# Patient Record
Sex: FEMALE | Race: WHITE
[De-identification: names, ages, dates, MRNs, and addresses within clinical notes are randomized per-mention and may not be internally consistent; named-entity substitution may affect disease eponyms.]

---

## 2021-10-03 ENCOUNTER — HOSPITAL ENCOUNTER (EMERGENCY)
Dept: HOSPITAL 79 - ER1 | Age: 56
Discharge: HOME | End: 2021-10-03
Payer: MEDICARE

## 2021-10-03 DIAGNOSIS — R07.2: Primary | ICD-10-CM

## 2021-10-03 LAB
BUN/CREATININE RATIO: 17 (ref 0–10)
HGB BLD-MCNC: 14.4 GM/DL (ref 12.3–15.3)
RED BLOOD COUNT: 4.68 M/UL (ref 4–5.1)
WHITE BLOOD COUNT: 11.1 K/UL (ref 4.5–11)

## 2021-11-08 ENCOUNTER — OFFICE VISIT (OUTPATIENT)
Dept: CARDIOLOGY | Facility: CLINIC | Age: 56
End: 2021-11-08

## 2021-11-08 VITALS
DIASTOLIC BLOOD PRESSURE: 74 MMHG | SYSTOLIC BLOOD PRESSURE: 139 MMHG | BODY MASS INDEX: 22.96 KG/M2 | HEART RATE: 75 BPM | OXYGEN SATURATION: 98 % | HEIGHT: 71 IN | WEIGHT: 164 LBS

## 2021-11-08 DIAGNOSIS — R07.2 PRECORDIAL CHEST PAIN: Primary | ICD-10-CM

## 2021-11-08 DIAGNOSIS — R06.02 SOB (SHORTNESS OF BREATH): ICD-10-CM

## 2021-11-08 PROCEDURE — 99204 OFFICE O/P NEW MOD 45 MIN: CPT | Performed by: NURSE PRACTITIONER

## 2021-11-08 RX ORDER — CITALOPRAM 20 MG/1
20 TABLET ORAL DAILY
COMMUNITY

## 2021-11-08 RX ORDER — SIMVASTATIN 20 MG
20 TABLET ORAL NIGHTLY
COMMUNITY
End: 2022-02-10 | Stop reason: SDUPTHER

## 2021-11-08 RX ORDER — ARFORMOTEROL TARTRATE 15 UG/2ML
SOLUTION RESPIRATORY (INHALATION)
COMMUNITY

## 2021-11-08 RX ORDER — OMEPRAZOLE 20 MG/1
20 CAPSULE, DELAYED RELEASE ORAL DAILY
COMMUNITY
End: 2022-03-11

## 2021-11-08 RX ORDER — AMOXICILLIN 500 MG/1
1000 CAPSULE ORAL 2 TIMES DAILY
COMMUNITY
End: 2022-01-19

## 2021-11-08 RX ORDER — BUDESONIDE 0.25 MG/2ML
0.25 INHALANT ORAL
COMMUNITY

## 2021-11-08 RX ORDER — GLIMEPIRIDE 4 MG/1
4 TABLET ORAL
COMMUNITY

## 2021-11-08 RX ORDER — CETIRIZINE HYDROCHLORIDE 10 MG/1
10 TABLET ORAL DAILY
COMMUNITY

## 2021-11-08 NOTE — PATIENT INSTRUCTIONS
Acute Coronary Syndrome  Acute coronary syndrome (ACS) is a serious problem in which there is suddenly not enough blood and oxygen reaching the heart. ACS can result in chest pain or a heart attack.  This condition is a medical emergency. If you have any symptoms of this condition, get help right away.  What are the causes?  This condition may be caused by:  · A buildup of fat and cholesterol inside the arteries (atherosclerosis). This is the most common cause. The buildup (plaque) can cause blood vessels in the heart (coronary arteries) to become narrow or blocked, which reduces blood flow to the heart. Plaque can also break off and lead to a clot, which can block an artery and cause a heart attack or stroke.  · Sudden tightening of the muscles around the coronary arteries (coronary spasm).  · Tearing of a coronary artery (spontaneous coronary artery dissection).  · Very low blood pressure (hypotension).  · An abnormal heartbeat (arrhythmia).  · Other medical conditions that cause a decrease of oxygen to the heart, such as anemiaorrespiratory failure.  · Using cocaine or methamphetamine.  What increases the risk?  The following factors may make you more likely to develop this condition:  · Age. The risk for ACS increases as you get older.  · History of chest pain, heart attack, peripheral artery disease, or stroke.  · Having taken chemotherapy or immune-suppressing medicines.  · Being male.  · Family history of chest pain, heart disease, or stroke.  · Smoking.  · Not exercising enough.  · Being overweight.  · High cholesterol.  · High blood pressure (hypertension).  · Diabetes.  · Excessive alcohol use.  What are the signs or symptoms?  Common symptoms of this condition include:  · Chest pain. The pain may last a long time, or it may stop and come back (recur). It may feel like:  ? Crushing or squeezing.  ? Tightness, pressure, fullness, or heaviness.  · Arm, neck, jaw, or back pain.  · Heartburn or  indigestion.  · Shortness of breath.  · Nausea.  · Sudden cold sweats.  · Light-headedness.  · Dizziness or passing out.  · Tiredness (fatigue).  Sometimes there are no symptoms.  How is this diagnosed?  This condition may be diagnosed based on:  · Your medical history and symptoms.  · Imaging tests, such as:  ? An electrocardiogram (ECG). This measures the heart's electrical activity.  ? X-rays.  ? CT scan.  ? A coronary angiogram. For this test, dye is injected into the heart arteries and then X-rays are taken.  ? Myocardial perfusion imaging. This test shows how well blood flows through your heart muscle.  · Blood tests. These may be repeated at certain time intervals.  · Exercise stress testing.  · Echocardiogram. This is a test that uses sound waves to produce detailed images of the heart.  How is this treated?  Treatment for this condition may include:  · Oxygen therapy.  · Medicines, such as:  ? Antiplatelet medicines and blood-thinning medicines, such as aspirin. These help prevent blood clots.  ? Medicine that dissolves any blood clots (fibrinolytic therapy).  ? Blood pressure medicines.  ? Nitroglycerin. This helps widen blood vessels to improve blood flow.  ? Pain medicine.  ? Cholesterol-lowering medicine.  · Surgery, such as:  ? Coronary angioplasty with stent placement. This involves placing a small piece of metal that looks like mesh or a spring into a narrow coronary artery. This widens the artery and keeps it open.  ? Coronary artery bypass surgery. This involves taking a section of a blood vessel from a different part of your body and placing it on the blocked coronary artery to allow blood to flow around the blockage.  · Cardiac rehabilitation. This is a program that includes exercise training, education, and counseling to help you recover.  Follow these instructions at home:  Eating and drinking  · Eat a heart-healthy diet that includes whole grains, fruits and vegetables, lean proteins, and  low-fat or nonfat dairy products.  · Limit how much salt (sodium) you eat as told by your health care provider. Follow instructions from your health care provider about any other eating or drinking restrictions, such as limiting foods that are high in fat and processed sugars.  · Use healthy cooking methods such as roasting, grilling, broiling, baking, poaching, steaming, or stir-frying.  · Work with a dietitian to follow a heart-healthy eating plan.  Medicines  · Take over-the-counter and prescription medicines only as told by your health care provider.  · Do not take these medicines unless your health care provider approves:  ? Vitamin supplements that contain vitamin A or vitamin E.  ? NSAIDs, such as ibuprofen, naproxen, or celecoxib.  ? Hormone replacement therapy that contains estrogen.  · If you are taking blood thinners:  ? Talk with your health care provider before you take any medicines that contain aspirin or NSAIDs. These medicines increase your risk for dangerous bleeding.  ? Take your medicine exactly as told, at the same time every day.  ? Avoid activities that could cause injury or bruising, and follow instructions about how to prevent falls.  ? Wear a medical alert bracelet, and carry a card that lists what medicines you take.  Activity  · Follow your cardiac rehabilitation program. Do exercises as told by your physical therapist.  · Ask your health care provider what activities and exercises are safe for you. Follow his or her instructions about lifting, driving, or climbing stairs.  Lifestyle  · Do not use any products that contain nicotine or tobacco, such as cigarettes, e-cigarettes, and chewing tobacco. If you need help quitting, ask your health care provider.  · Do not drink alcohol if your health care provider tells you not to drink.  · If you drink alcohol:  ? Limit how much you have to 0-1 drink a day.  ? Be aware of how much alcohol is in your drink. In the [place] one drink equals one 12 oz  bottle of beer (355 mL), one 5 oz glass of wine (148 mL), or one 1½ oz glass of hard liquor (44 mL).  · Maintain a healthy weight. If you need to lose weight, work with your health care provider to do so safely.  General instructions  · Tell all the health care providers who provide care for you about your heart condition, including your dentist. This may affect the medicines or treatment you receive.  · Manage any other health conditions you have, such as hypertension or diabetes. These conditions affect your heart.  · Pay attention to your mental health. You may be at higher risk for depression.  ? Find ways to manage stress.  ? Talk to your health care provider about depression screening and treatment.  · Keep your vaccinations up to date.  ? Get the flu shot (influenza vaccine) every year.  ? Get the pneumococcal vaccine if you are age 65 or older.  · If directed, monitor your blood pressure at home.  · Keep all follow-up visits as told by your health care provider. This is important.  Contact a health care provider if you:  · Feel overwhelmed or sad.  · Have trouble doing your daily activities.  Get help right away if you:  · Have pain in your chest, neck, arm, jaw, stomach, or back that recurs, and:  ? It lasts for more than a few minutes.  ? It is not relieved by taking the medicineyour health care provider prescribed.  · Have unexplained:  ? Heavy sweating.  ? Heartburn or indigestion.  ? Nausea or vomiting.  ? Shortness of breath.  ? Difficulty breathing.  ? Fatigue.  ? Nervousness or anxiety.  ? Weakness.  ? Diarrhea.  ? Dark stools or blood in your stool.  · Have sudden light-headedness or dizziness.  · Have blood pressure that is higher than 180/120.  · Faint.  · Have thoughts about hurting yourself.  These symptoms may represent a serious problem that is an emergency. Do not wait to see if the symptoms will go away. Get medical help right away. Call your local emergency services (911 in the [place] Do  not drive yourself to the hospital.   Summary  · Acute coronary syndrome (ACS) is when there is not enough blood and oxygen being supplied to the heart. ACS can result in chest pain or a heart attack.  · Acute coronary syndrome is a medical emergency. If you have any symptoms of this condition, get help right away.  · Treatment includes medicines and procedures to open the blocked arteries and restore blood flow.  This information is not intended to replace advice given to you by your health care provider. Make sure you discuss any questions you have with your health care provider.  Document Revised: 05/20/2020 Document Reviewed: 12/30/2019  [employer] Patient Education © 2021 [employer]      Hypertension, Adult  Hypertension is another name for high blood pressure. High blood pressure forces your heart to work harder to pump blood. This can cause problems over time.  There are two numbers in a blood pressure reading. There is a top number (systolic) over a bottom number (diastolic). It is best to have a blood pressure that is below 120/80. Healthy choices can help lower your blood pressure, or you may need medicine to help lower it.  What are the causes?  The cause of this condition is not known. Some conditions may be related to high blood pressure.  What increases the risk?  · Smoking.  · Having type 2 diabetes mellitus, high cholesterol, or both.  · Not getting enough exercise or physical activity.  · Being overweight.  · Having too much fat, sugar, calories, or salt (sodium) in your diet.  · Drinking too much alcohol.  · Having long-term (chronic) kidney disease.  · Having a family history of high blood pressure.  · Age. Risk increases with age.  · Race. You may be at higher risk if you are .  · Gender. Men are at higher risk than women before age 45. After age 65, women are at higher risk than men.  · Having obstructive sleep apnea.  · Stress.  What are the signs or symptoms?  · High blood  pressure may not cause symptoms. Very high blood pressure (hypertensive crisis) may cause:  ? Headache.  ? Feelings of worry or nervousness (anxiety).  ? Shortness of breath.  ? Nosebleed.  ? A feeling of being sick to your stomach (nausea).  ? Throwing up (vomiting).  ? Changes in how you see.  ? Very bad chest pain.  ? Seizures.  How is this treated?  · This condition is treated by making healthy lifestyle changes, such as:  ? Eating healthy foods.  ? Exercising more.  ? Drinking less alcohol.  · Your health care provider may prescribe medicine if lifestyle changes are not enough to get your blood pressure under control, and if:  ? Your top number is above 130.  ? Your bottom number is above 80.  · Your personal target blood pressure may vary.  Follow these instructions at home:  Eating and drinking    · If told, follow the DASH eating plan. To follow this plan:  ? Fill one half of your plate at each meal with fruits and vegetables.  ? Fill one fourth of your plate at each meal with whole grains. Whole grains include whole-wheat pasta, brown rice, and whole-grain bread.  ? Eat or drink low-fat dairy products, such as skim milk or low-fat yogurt.  ? Fill one fourth of your plate at each meal with low-fat (lean) proteins. Low-fat proteins include fish, chicken without skin, eggs, beans, and tofu.  ? Avoid fatty meat, cured and processed meat, or chicken with skin.  ? Avoid pre-made or processed food.  · Eat less than 1,500 mg of salt each day.  · Do not drink alcohol if:  ? Your doctor tells you not to drink.  ? You are pregnant, may be pregnant, or are planning to become pregnant.  · If you drink alcohol:  ? Limit how much you use to:  § 0-1 drink a day for women.  § 0-2 drinks a day for men.  ? Be aware of how much alcohol is in your drink. In the [place] one drink equals one 12 oz bottle of beer (355 mL), one 5 oz glass of wine (148 mL), or one 1½ oz glass of hard liquor (44 mL).    Lifestyle    · Work with your  doctor to stay at a healthy weight or to lose weight. Ask your doctor what the best weight is for you.  · Get at least 30 minutes of exercise most days of the week. This may include walking, swimming, or biking.  · Get at least 30 minutes of exercise that strengthens your muscles (resistance exercise) at least 3 days a week. This may include lifting weights or doing Pilates.  · Do not use any products that contain nicotine or tobacco, such as cigarettes, e-cigarettes, and chewing tobacco. If you need help quitting, ask your doctor.  · Check your blood pressure at home as told by your doctor.  · Keep all follow-up visits as told by your doctor. This is important.    Medicines  · Take over-the-counter and prescription medicines only as told by your doctor. Follow directions carefully.  · Do not skip doses of blood pressure medicine. The medicine does not work as well if you skip doses. Skipping doses also puts you at risk for problems.  · Ask your doctor about side effects or reactions to medicines that you should watch for.  Contact a doctor if you:  · Think you are having a reaction to the medicine you are taking.  · Have headaches that keep coming back (recurring).  · Feel dizzy.  · Have swelling in your ankles.  · Have trouble with your vision.  Get help right away if you:  · Get a very bad headache.  · Start to feel mixed up (confused).  · Feel weak or numb.  · Feel faint.  · Have very bad pain in your:  ? Chest.  ? Belly (abdomen).  · Throw up more than once.  · Have trouble breathing.  Summary  · Hypertension is another name for high blood pressure.  · High blood pressure forces your heart to work harder to pump blood.  · For most people, a normal blood pressure is less than 120/80.  · Making healthy choices can help lower blood pressure. If your blood pressure does not get lower with healthy choices, you may need to take medicine.  This information is not intended to replace advice given to you by your health  care provider. Make sure you discuss any questions you have with your health care provider.  Document Revised: 08/28/2019 Document Reviewed: 08/28/2019  [employer] Patient Education © 2021 [employer]

## 2021-11-08 NOTE — PROGRESS NOTES
"Subjective     Alysia Brennan is a 56 y.o. female who presents to day for Establish Care (presents for cardiac eval), Chest Pain, and Shortness of Breath (COPD).    CHIEF COMPLIANT  Chief Complaint   Patient presents with   • Establish Care     presents for cardiac eval   • Chest Pain   • Shortness of Breath     COPD       Active Problems:  Problem List Items Addressed This Visit     None      Visit Diagnoses     Precordial chest pain    -  Primary    Relevant Orders    Adult Transthoracic Echo Complete W/ Cont if Necessary Per Protocol    Stress Test With Myocardial Perfusion One Day    SOB (shortness of breath)        Relevant Orders    Adult Transthoracic Echo Complete W/ Cont if Necessary Per Protocol    Stress Test With Myocardial Perfusion One Day      Problem list  1.  Chest pain  2.  Shortness of breath  3.    HPI  HPI  Ms. Brennan is a 56-year-old female patient who is being seen today to establish care for chest pain and shortness of breath.  Patient does report chest pain has been starting since 10/3/2021 in which she was evaluated in the emergency department.  We do not have these records at this time and unable to review them.  She does report that she had pain under her left breast and it started when she woke up that morning and Throughout the Day and Continued to Get Worse.  Pretty Much Lasted for the Duration of the Entire Day.  She Is Described As a \"Hurting\".  She Did Have Some Associated Nausea and It Occurs with Both Rest and Exertion.  She Says That It Was Later in the Evening When She Was in the Emergency Department and Finally Requested Medications for Pain and They Did Give to Her and Her Friend and Relieved.  She denied that they gave her nitroglycerin.  She said the pain was quite severe at times.  She also has exertional dyspnea where she does not become dyspneic at rest.  She does have a history of COPD and has O2 per nasal cannula as needed.  She says she is not needed her oxygen per " nasal cannula in quite some time.  She is followed by Dr. Caldera for pulmonology.  We did review her EKG that showed a normal sinus rhythm at 69 with nonspecific ST changes but no acute changes.  She denies any fatigue, lower extremity edema, palpitations, dizziness, lightheadedness, syncope, PND, orthopnea, or strokelike symptoms.  We did review her labs that was provided by her PCP as well and she denied any questions.  PRIOR MEDS  Current Outpatient Medications on File Prior to Visit   Medication Sig Dispense Refill   • amoxicillin (AMOXIL) 500 MG capsule Take 1,000 mg by mouth 2 (Two) Times a Day.     • arformoterol (BROVANA) 15 MCG/2ML nebulizer solution Take  by nebulization 2 (Two) Times a Day.     • budesonide (PULMICORT) 0.25 MG/2ML nebulizer solution Take 0.25 mg by nebulization Daily.     • cetirizine (zyrTEC) 10 MG tablet Take 10 mg by mouth Daily.     • citalopram (CeleXA) 40 MG tablet Take 40 mg by mouth Daily.     • glimepiride (AMARYL) 4 MG tablet Take 4 mg by mouth Every Morning Before Breakfast.     • metFORMIN (GLUCOPHAGE) 1000 MG tablet Take 1,000 mg by mouth 2 (Two) Times a Day With Meals.     • omeprazole (priLOSEC) 20 MG capsule Take 20 mg by mouth Daily.     • simvastatin (ZOCOR) 20 MG tablet Take 20 mg by mouth Every Night.     • theophylline (EDMOND-24) 300 MG 24 hr capsule Take 300 mg by mouth Daily.       No current facility-administered medications on file prior to visit.       ALLERGIES  Patient has no known allergies.    HISTORY  Past Medical History:   Diagnosis Date   • COPD (chronic obstructive pulmonary disease) (HCC)    • Diabetes mellitus (HCC)    • Hyperlipidemia        Social History     Socioeconomic History   • Marital status:    Tobacco Use   • Smoking status: Current Every Day Smoker     Years: 30.00     Types: Cigarettes   • Smokeless tobacco: Never Used   Substance and Sexual Activity   • Alcohol use: Never   • Drug use: Never       Family History   Problem Relation  "Age of Onset   • No Known Problems Mother    • No Known Problems Father        Review of Systems   Constitutional: Negative.  Negative for chills, diaphoresis, fatigue and fever.   HENT: Negative.         Tooth infection   Respiratory: Positive for shortness of breath (COPD, has 02 if needed ) and wheezing. Negative for apnea, cough and chest tightness.         COPD, sees Dr Caldera   Cardiovascular: Positive for chest pain (under left breast starting 10/3/21, seen at [facility]). Negative for palpitations and leg swelling.   Gastrointestinal: Positive for nausea (drainage from tooth infection). Negative for abdominal pain, blood in stool, constipation, diarrhea and vomiting.   Endocrine: Negative.    Genitourinary: Negative.  Negative for hematuria.   Musculoskeletal: Negative.  Negative for arthralgias, back pain, myalgias and neck pain.   Skin: Negative.    Allergic/Immunologic: Negative.    Neurological: Positive for numbness (funny feeling in arms and legs similiar to RLS). Negative for dizziness, syncope, weakness, light-headedness and headaches.   Hematological: Bruises/bleeds easily.   Psychiatric/Behavioral: Negative for sleep disturbance.       Objective     VITALS: /74 (BP Location: Left arm, Patient Position: Sitting)   Pulse 75   Ht 180.3 cm (71\")   Wt 74.4 kg (164 lb)   SpO2 98%   BMI 22.87 kg/m²     LABS:   Lab Results (most recent)     None          IMAGING:   No Images in the past 120 days found..    EXAM:  Physical Exam  Vitals and nursing note reviewed.   Constitutional:       Appearance: She is well-developed.   HENT:      Head: Normocephalic.   Eyes:      Pupils: Pupils are equal, round, and reactive to light.   Neck:      Thyroid: No thyroid mass.      Vascular: No carotid bruit or JVD.      Trachea: Trachea and phonation normal.   Cardiovascular:      Rate and Rhythm: Normal rate and regular rhythm.      Pulses:           Radial pulses are 2+ on the right side and 2+ on the left side. "        Posterior tibial pulses are 2+ on the right side and 2+ on the left side.      Heart sounds: Normal heart sounds. No murmur heard.  No friction rub. No gallop.    Pulmonary:      Effort: Pulmonary effort is normal. No respiratory distress.      Breath sounds: Normal breath sounds. No wheezing or rales.   Abdominal:      General: Bowel sounds are normal.      Palpations: Abdomen is soft.   Musculoskeletal:         General: Normal range of motion.      Cervical back: Neck supple.   Skin:     General: Skin is warm and dry.      Capillary Refill: Capillary refill takes less than 2 seconds.      Findings: No rash.   Neurological:      Mental Status: She is alert and oriented to person, place, and time.   Psychiatric:         Speech: Speech normal.         Behavior: Behavior normal.         Thought Content: Thought content normal.         Judgment: Judgment normal.         Procedure   Procedures       Assessment/Plan    Diagnosis Plan   1. Precordial chest pain  Adult Transthoracic Echo Complete W/ Cont if Necessary Per Protocol    Stress Test With Myocardial Perfusion One Day   2. SOB (shortness of breath)  Adult Transthoracic Echo Complete W/ Cont if Necessary Per Protocol    Stress Test With Myocardial Perfusion One Day   1.  Patient does have atypical chest pain that started in October and required her to be evaluated in the emergency department.  This mainly occurred on her left breast and lasted for the duration of the entire day.  Due to this and her debilities mellitus and hyperlipidemia I do feel it is appropriate to have patient go under ischemic work-up including stress test and echocardiogram.  2.  Patient was prescribed sublingual nitroglycerin and advised he can take up to 3 doses 5 minutes apart and if pain is not relieved after the third dose go to the emergency department.  3.  Informed of signs and symptoms of ACS and advised to seek emergent treatment for any new worsening symptoms.  Patient also  advised sooner follow-up as needed.  Also advised to follow-up with family doctor as needed  This note is dictated utilizing voice recognition software.  Although this record has been proof read, transcriptional errors may still be present. If questions occur regarding the content of this record please do not hesitate to call our office.  I have reviewed and confirmed the accuracy of the ROS as documented by the MA/LPN/RN TRAVIS Joseph    Return in about 3 months (around 2/8/2022), or if symptoms worsen or fail to improve.    Diagnoses and all orders for this visit:    1. Precordial chest pain (Primary)  -     Adult Transthoracic Echo Complete W/ Cont if Necessary Per Protocol; Future  -     Stress Test With Myocardial Perfusion One Day; Future    2. SOB (shortness of breath)  -     Adult Transthoracic Echo Complete W/ Cont if Necessary Per Protocol; Future  -     Stress Test With Myocardial Perfusion One Day; Future               MEDS ORDERED DURING VISIT:  No orders of the defined types were placed in this encounter.          This document has been electronically signed by TRAVIS Joseph Jr.  November 8, 2021 15:36 EST

## 2021-12-17 ENCOUNTER — APPOINTMENT (OUTPATIENT)
Dept: CARDIOLOGY | Facility: HOSPITAL | Age: 56
End: 2021-12-17

## 2022-01-07 ENCOUNTER — APPOINTMENT (OUTPATIENT)
Dept: CARDIOLOGY | Facility: HOSPITAL | Age: 57
End: 2022-01-07

## 2022-01-07 ENCOUNTER — HOSPITAL ENCOUNTER (OUTPATIENT)
Dept: CARDIOLOGY | Facility: HOSPITAL | Age: 57
Discharge: HOME | End: 2022-01-07
Admitting: NURSE PRACTITIONER

## 2022-01-07 ENCOUNTER — HOSPITAL ENCOUNTER (OUTPATIENT)
Dept: CARDIOLOGY | Facility: HOSPITAL | Age: 57
End: 2022-01-07

## 2022-01-07 DIAGNOSIS — R07.2 PRECORDIAL CHEST PAIN: ICD-10-CM

## 2022-01-07 DIAGNOSIS — R06.02 SOB (SHORTNESS OF BREATH): ICD-10-CM

## 2022-01-07 PROCEDURE — 93306 TTE W/DOPPLER COMPLETE: CPT | Performed by: INTERNAL MEDICINE

## 2022-01-07 PROCEDURE — 93306 TTE W/DOPPLER COMPLETE: CPT

## 2022-01-10 LAB
BH CV ECHO MEAS - ACS: 2.1 CM
BH CV ECHO MEAS - AO MAX PG: 4.2 MMHG
BH CV ECHO MEAS - AO MEAN PG: 3 MMHG
BH CV ECHO MEAS - AO ROOT AREA (BSA CORRECTED): 1.5
BH CV ECHO MEAS - AO ROOT AREA: 6.6 CM^2
BH CV ECHO MEAS - AO ROOT DIAM: 2.9 CM
BH CV ECHO MEAS - AO V2 MAX: 102 CM/SEC
BH CV ECHO MEAS - AO V2 MEAN: 77.1 CM/SEC
BH CV ECHO MEAS - AO V2 VTI: 26.5 CM
BH CV ECHO MEAS - BSA(HAYCOCK): 1.9 M^2
BH CV ECHO MEAS - BSA: 1.9 M^2
BH CV ECHO MEAS - BZI_BMI: 22.9 KILOGRAMS/M^2
BH CV ECHO MEAS - BZI_METRIC_HEIGHT: 180.3 CM
BH CV ECHO MEAS - BZI_METRIC_WEIGHT: 74.4 KG
BH CV ECHO MEAS - EDV(CUBED): 89.3 ML
BH CV ECHO MEAS - EDV(MOD-SP4): 73.4 ML
BH CV ECHO MEAS - EDV(TEICH): 91 ML
BH CV ECHO MEAS - EF(CUBED): 74.1 %
BH CV ECHO MEAS - EF(MOD-SP4): 72.1 %
BH CV ECHO MEAS - EF(TEICH): 66.1 %
BH CV ECHO MEAS - ESV(CUBED): 23.1 ML
BH CV ECHO MEAS - ESV(MOD-SP4): 20.5 ML
BH CV ECHO MEAS - ESV(TEICH): 30.9 ML
BH CV ECHO MEAS - FS: 36.2 %
BH CV ECHO MEAS - IVS/LVPW: 1.3
BH CV ECHO MEAS - IVSD: 1.1 CM
BH CV ECHO MEAS - LA DIMENSION: 3.1 CM
BH CV ECHO MEAS - LA/AO: 1.1
BH CV ECHO MEAS - LV DIASTOLIC VOL/BSA (35-75): 37.9 ML/M^2
BH CV ECHO MEAS - LV IVRT: 0.12 SEC
BH CV ECHO MEAS - LV MASS(C)D: 144.5 GRAMS
BH CV ECHO MEAS - LV MASS(C)DI: 74.5 GRAMS/M^2
BH CV ECHO MEAS - LV SYSTOLIC VOL/BSA (12-30): 10.6 ML/M^2
BH CV ECHO MEAS - LVIDD: 4.5 CM
BH CV ECHO MEAS - LVIDS: 2.9 CM
BH CV ECHO MEAS - LVLD AP4: 7 CM
BH CV ECHO MEAS - LVLS AP4: 4.9 CM
BH CV ECHO MEAS - LVOT AREA (M): 2.8 CM^2
BH CV ECHO MEAS - LVOT AREA: 2.8 CM^2
BH CV ECHO MEAS - LVOT DIAM: 1.9 CM
BH CV ECHO MEAS - LVPWD: 0.85 CM
BH CV ECHO MEAS - MV A MAX VEL: 75 CM/SEC
BH CV ECHO MEAS - MV DEC SLOPE: 301 CM/SEC^2
BH CV ECHO MEAS - MV E MAX VEL: 84.8 CM/SEC
BH CV ECHO MEAS - MV E/A: 1.1
BH CV ECHO MEAS - RVDD: 2.9 CM
BH CV ECHO MEAS - SI(AO): 90.3 ML/M^2
BH CV ECHO MEAS - SI(CUBED): 34.1 ML/M^2
BH CV ECHO MEAS - SI(MOD-SP4): 27.3 ML/M^2
BH CV ECHO MEAS - SI(TEICH): 31 ML/M^2
BH CV ECHO MEAS - SV(AO): 175 ML
BH CV ECHO MEAS - SV(CUBED): 66.2 ML
BH CV ECHO MEAS - SV(MOD-SP4): 52.9 ML
BH CV ECHO MEAS - SV(TEICH): 60.1 ML
MAXIMAL PREDICTED HEART RATE: 162 BPM
STRESS TARGET HR: 138 BPM

## 2022-01-12 ENCOUNTER — HOSPITAL ENCOUNTER (OUTPATIENT)
Dept: CARDIOLOGY | Facility: HOSPITAL | Age: 57
Discharge: HOME | End: 2022-01-12

## 2022-01-12 DIAGNOSIS — R06.02 SOB (SHORTNESS OF BREATH): ICD-10-CM

## 2022-01-12 DIAGNOSIS — R07.2 PRECORDIAL CHEST PAIN: ICD-10-CM

## 2022-01-12 PROCEDURE — 93017 CV STRESS TEST TRACING ONLY: CPT

## 2022-01-12 PROCEDURE — 0 TECHNETIUM SESTAMIBI: Performed by: INTERNAL MEDICINE

## 2022-01-12 PROCEDURE — A9500 TC99M SESTAMIBI: HCPCS | Performed by: INTERNAL MEDICINE

## 2022-01-12 PROCEDURE — 78452 HT MUSCLE IMAGE SPECT MULT: CPT | Performed by: INTERNAL MEDICINE

## 2022-01-12 PROCEDURE — 93018 CV STRESS TEST I&R ONLY: CPT | Performed by: INTERNAL MEDICINE

## 2022-01-12 PROCEDURE — 78452 HT MUSCLE IMAGE SPECT MULT: CPT

## 2022-01-12 RX ADMIN — TECHNETIUM TC 99M SESTAMIBI 1 DOSE: 1 INJECTION INTRAVENOUS at 08:20

## 2022-01-12 RX ADMIN — TECHNETIUM TC 99M SESTAMIBI 1 DOSE: 1 INJECTION INTRAVENOUS at 10:16

## 2022-01-13 ENCOUNTER — TELEPHONE (OUTPATIENT)
Dept: CARDIOLOGY | Facility: CLINIC | Age: 57
End: 2022-01-13

## 2022-01-13 LAB
BH CV REST NUCLEAR ISOTOPE DOSE: 10 MCI
BH CV STRESS NUCLEAR ISOTOPE DOSE: 30 MCI
BH CV STRESS RECOVERY BP: NORMAL MMHG
BH CV STRESS RECOVERY HR: 70 BPM
MAXIMAL PREDICTED HEART RATE: 162 BPM
PERCENT MAX PREDICTED HR: 76.54 %
STRESS BASELINE BP: NORMAL MMHG
STRESS BASELINE HR: 62 BPM
STRESS PERCENT HR: 90 %
STRESS POST ESTIMATED WORKLOAD: 10.1 METS
STRESS POST EXERCISE DUR MIN: 7 MIN
STRESS POST EXERCISE DUR SEC: 30 SEC
STRESS POST PEAK BP: NORMAL MMHG
STRESS POST PEAK HR: 124 BPM
STRESS TARGET HR: 138 BPM

## 2022-01-13 NOTE — TELEPHONE ENCOUNTER
----- Message from Lesly Dooley MA sent at 1/12/2022  3:55 PM EST -----    ----- Message -----  From: Kei Cartagena APRN  Sent: 1/12/2022   2:51 PM EST  To: Lesly Dooley MA    There is no acute findings on the echocardiogram.  Keep follow-up.  ----- Message -----  From: Montana Maria MD  Sent: 1/10/2022   9:36 PM EST  To: TRAVIS Joseph

## 2022-01-13 NOTE — TELEPHONE ENCOUNTER
Patient informed of results. She is not available for opening tomorrow, follow up scheduled Wednesday. Lesly Dooley MA      ----- Message from TRAVIS Joseph sent at 1/13/2022 12:40 PM EST -----  Regarding: FW:  Positive stress test 2 week follow up  ----- Message -----  From: Montana Maria MD  Sent: 1/13/2022  11:18 AM EST  To: TRAVIS Joseph    Result Text  1.  Adequate functional capacity without chest pain.  The patient exercised 7 minutes and 30 seconds on a Gonzalo protocol to a heart rate of 124, systolic blood pressure 166, rate-pressure product of greater than 20,000, and then noted to consumption of 10.1 METS.  The patient achieved 76% of age adjusted maximum predicted heart rate.  Exercise was stopped at patient request because of fatigue and shortness of breath.     2.  Mildly blunted heart rate and physiologic blood pressure responses to exercise.     3.  Exercise EKG is uninterpretable because of motion artifact.  At 1 minute into recovery no diagnostic ST segment changes were noted.     4.  Scintigraphy demonstrates small, mild ischemic defects confined to the inferobasilar and inferoapical segments.  There may be a mild degree of extension into the diaphragmatic segment as well.     5.  Preserved post-rest ejection fraction of 56% with no focal wall motion abnormalities.     6.  No evidence of transient ischemic dilation or of increased lung uptake of radiopharmaceutical.

## 2022-01-19 ENCOUNTER — OFFICE VISIT (OUTPATIENT)
Dept: CARDIOLOGY | Facility: CLINIC | Age: 57
End: 2022-01-19

## 2022-01-19 VITALS
OXYGEN SATURATION: 98 % | HEIGHT: 71 IN | DIASTOLIC BLOOD PRESSURE: 65 MMHG | BODY MASS INDEX: 22.85 KG/M2 | SYSTOLIC BLOOD PRESSURE: 111 MMHG | HEART RATE: 73 BPM | WEIGHT: 163.2 LBS

## 2022-01-19 DIAGNOSIS — R07.2 PRECORDIAL CHEST PAIN: Primary | ICD-10-CM

## 2022-01-19 DIAGNOSIS — R94.39 POSITIVE CARDIAC STRESS TEST: ICD-10-CM

## 2022-01-19 DIAGNOSIS — R06.02 SOB (SHORTNESS OF BREATH): ICD-10-CM

## 2022-01-19 PROCEDURE — 99214 OFFICE O/P EST MOD 30 MIN: CPT | Performed by: NURSE PRACTITIONER

## 2022-01-19 RX ORDER — METOPROLOL TARTRATE 100 MG/1
TABLET ORAL
Qty: 2 TABLET | Refills: 0 | Status: SHIPPED | OUTPATIENT
Start: 2022-01-19 | End: 2022-02-10

## 2022-01-19 RX ORDER — NITROGLYCERIN 0.4 MG/1
TABLET SUBLINGUAL
Qty: 30 TABLET | Refills: 5 | Status: SHIPPED | OUTPATIENT
Start: 2022-01-19 | End: 2022-10-05 | Stop reason: SDUPTHER

## 2022-01-19 NOTE — PROGRESS NOTES
Subjective     Alysia Brennan is a 56 y.o. female who presents to day for Shortness of Breath (presents for abn stress and echo f/u) and Fatigue.    CHIEF COMPLIANT  Chief Complaint   Patient presents with   • Shortness of Breath     presents for abn stress and echo f/u   • Fatigue       Active Problems:  Problem List Items Addressed This Visit     None      Visit Diagnoses     Precordial chest pain    -  Primary    SOB (shortness of breath)        Relevant Medications    metoprolol tartrate (LOPRESSOR) 100 MG tablet    nitroglycerin (NITROSTAT) 0.4 MG SL tablet    Other Relevant Orders    CT Angiogram Heart With 3D Image    Basic Metabolic Panel    Positive cardiac stress test        Relevant Medications    metoprolol tartrate (LOPRESSOR) 100 MG tablet    nitroglycerin (NITROSTAT) 0.4 MG SL tablet    Other Relevant Orders    CT Angiogram Heart With 3D Image    Basic Metabolic Panel        Problem list  1.  Chest pain  1.1 stress test 1/22: Small, mild ischemic defect confined to the inferior basilar and inferior apical segments there may be mild degree of extension into the diaphragmatic segments as well, preserved EF 56%  2.  Shortness of breath, sees Dr Caldera  2.1 echocardiogram 1/22: EF 50 to 55% questionable mild septal hypokinesis, grade 1 diastolic dysfunction, no significant valvular disease    HPI  HPI  Ms. Brennan is a 56-year-old female patient who is being followed up today for shortness of breath and stress echo results.  Patient continues to have chronic shortness of breath in which she requires oxygen at times.  Her dyspnea mainly occurs with exertion such as carrying something or doing something strenuous she says she can perform activities of daily living without any exacerbation of shortness of breath.  She previously did report an episode of chest pain that lasted pretty much throughout the entire day that occurred under her left breast.  It started in the morning and continued to get worse.  She  "describes it as a \"hurting\" type sensation.  She did have some associated nausea.  And the pain continued and not change in characteristic or intensity despite rest or exertion.  Her stress test did identify small mild ischemic defect confined to the inferior basilar and inferior apical segments that may have some degree of extension into the diaphragmatic areas as well as a preserved post-rest ejection fraction of 56%.  Patient has not had any recurrence of the chest pain.  She denies any chest pain, lower extremity edema, palpitations, dizziness, lightheadedness, syncope, PND, orthopnea, or strokelike symptoms.  PRIOR MEDS  Current Outpatient Medications on File Prior to Visit   Medication Sig Dispense Refill   • arformoterol (BROVANA) 15 MCG/2ML nebulizer solution Take  by nebulization 2 (Two) Times a Day.     • budesonide (PULMICORT) 0.25 MG/2ML nebulizer solution Take 0.25 mg by nebulization Daily.     • cetirizine (zyrTEC) 10 MG tablet Take 10 mg by mouth Daily.     • citalopram (CeleXA) 40 MG tablet Take 40 mg by mouth Daily.     • glimepiride (AMARYL) 4 MG tablet Take 4 mg by mouth Every Morning Before Breakfast.     • metFORMIN (GLUCOPHAGE) 1000 MG tablet Take 1,000 mg by mouth 2 (Two) Times a Day With Meals.     • omeprazole (priLOSEC) 20 MG capsule Take 20 mg by mouth Daily.     • simvastatin (ZOCOR) 20 MG tablet Take 20 mg by mouth Every Night.     • theophylline (EDMOND-24) 300 MG 24 hr capsule Take 300 mg by mouth Daily.     • [DISCONTINUED] amoxicillin (AMOXIL) 500 MG capsule Take 1,000 mg by mouth 2 (Two) Times a Day.       No current facility-administered medications on file prior to visit.       ALLERGIES  Patient has no known allergies.    HISTORY  Past Medical History:   Diagnosis Date   • COPD (chronic obstructive pulmonary disease) (HCC)    • Diabetes mellitus (HCC)    • Hyperlipidemia        Social History     Socioeconomic History   • Marital status:    Tobacco Use   • Smoking status: " "Current Every Day Smoker     Years: 30.00     Types: Cigarettes   • Smokeless tobacco: Never Used   Substance and Sexual Activity   • Alcohol use: Never   • Drug use: Never       Family History   Problem Relation Age of Onset   • No Known Problems Mother    • No Known Problems Father        Review of Systems   Constitutional: Positive for fatigue. Negative for chills, diaphoresis and fever.   HENT: Negative.    Eyes: Negative.  Negative for visual disturbance.   Respiratory: Positive for apnea (02 at 2L if needed ) and shortness of breath (on exertion, has 02 if needed ). Negative for cough, chest tightness and wheezing.         COPD   Cardiovascular: Negative.  Negative for chest pain, palpitations and leg swelling.   Gastrointestinal: Negative.  Negative for abdominal pain, blood in stool, constipation, diarrhea, nausea and vomiting.   Endocrine: Negative.    Genitourinary: Negative.  Negative for hematuria.   Musculoskeletal: Positive for joint swelling (L knee) and neck pain. Negative for arthralgias, back pain and myalgias.   Allergic/Immunologic: Negative.    Neurological: Negative.  Negative for dizziness, syncope, weakness, light-headedness, numbness and headaches.   Hematological: Positive for adenopathy. Bruises/bleeds easily (arms bruise easy).   Psychiatric/Behavioral: Negative.  Negative for sleep disturbance.       Objective     VITALS: /65 (BP Location: Left arm, Patient Position: Sitting)   Pulse 73   Ht 180.3 cm (70.98\")   Wt 74 kg (163 lb 3.2 oz)   SpO2 98%   BMI 22.77 kg/m²     LABS:   Lab Results (most recent)     None          IMAGING:   No Images in the past 120 days found..    EXAM:  Physical Exam  Vitals and nursing note reviewed.   Constitutional:       Appearance: She is well-developed.   HENT:      Head: Normocephalic.   Eyes:      Pupils: Pupils are equal, round, and reactive to light.   Neck:      Thyroid: No thyroid mass.      Vascular: No carotid bruit or JVD.      Trachea: " Trachea and phonation normal.   Cardiovascular:      Rate and Rhythm: Normal rate and regular rhythm.      Pulses:           Radial pulses are 2+ on the right side and 2+ on the left side.        Posterior tibial pulses are 2+ on the right side and 2+ on the left side.      Heart sounds: Normal heart sounds. No murmur heard.  No friction rub. No gallop.    Pulmonary:      Effort: Pulmonary effort is normal. No respiratory distress.      Breath sounds: Normal breath sounds. No wheezing or rales.   Abdominal:      General: Bowel sounds are normal.      Palpations: Abdomen is soft.   Musculoskeletal:         General: No swelling. Normal range of motion.      Cervical back: Neck supple.   Skin:     General: Skin is warm and dry.      Capillary Refill: Capillary refill takes less than 2 seconds.      Findings: No rash.   Neurological:      Mental Status: She is alert and oriented to person, place, and time.   Psychiatric:         Speech: Speech normal.         Behavior: Behavior normal.         Thought Content: Thought content normal.         Judgment: Judgment normal.         Procedure   Procedures       Assessment/Plan    Diagnosis Plan   1. Precordial chest pain     2. SOB (shortness of breath)  CT Angiogram Heart With 3D Image    Basic Metabolic Panel    metoprolol tartrate (LOPRESSOR) 100 MG tablet    nitroglycerin (NITROSTAT) 0.4 MG SL tablet   3. Positive cardiac stress test  CT Angiogram Heart With 3D Image    Basic Metabolic Panel    metoprolol tartrate (LOPRESSOR) 100 MG tablet    nitroglycerin (NITROSTAT) 0.4 MG SL tablet   1.  Patient has elected to undergo CTA of the heart.  I will prescribe metoprolol 100 mg to be taken the morning of the CTA and to take an additional 100 mg dose with him to the procedure and only take if advised by the radiologist.  Patient also advised to take nitroglycerin with him as well.  In addition I will assess patient's BMP prior to the CTA to evaluate renal function.  2.   Informed of signs and symptoms of ACS and advised to seek emergent treatment for any new worsening symptoms.  Patient also advised sooner follow-up as needed.  Also advised to follow-up with family doctor as needed  This note is dictated utilizing voice recognition software.  Although this record has been proof read, transcriptional errors may still be present. If questions occur regarding the content of this record please do not hesitate to call our office.  I have reviewed and confirmed the accuracy of the ROS as documented by the MA/LPN/RN TRAVIS Joseph    Return in about 3 months (around 2022).    Diagnoses and all orders for this visit:    1. Precordial chest pain (Primary)    2. SOB (shortness of breath)  -     CT Angiogram Heart With 3D Image; Future  -     Basic Metabolic Panel; Future  -     metoprolol tartrate (LOPRESSOR) 100 MG tablet; Metoprolol 100mg 7am morning of cta and take second dose with you  Dispense: 2 tablet; Refill: 0  -     nitroglycerin (NITROSTAT) 0.4 MG SL tablet; 1 under the tongue as needed for angina, may repeat q5mins for up three doses  Dispense: 30 tablet; Refill: 5    3. Positive cardiac stress test  -     CT Angiogram Heart With 3D Image; Future  -     Basic Metabolic Panel; Future  -     metoprolol tartrate (LOPRESSOR) 100 MG tablet; Metoprolol 100mg 7am morning of cta and take second dose with you  Dispense: 2 tablet; Refill: 0  -     nitroglycerin (NITROSTAT) 0.4 MG SL tablet; 1 under the tongue as needed for angina, may repeat q5mins for up three doses  Dispense: 30 tablet; Refill: 5             MEDS ORDERED DURING VISIT:  New Medications Ordered This Visit   Medications   • metoprolol tartrate (LOPRESSOR) 100 MG tablet     Sig: Metoprolol 100mg 7am morning of cta and take second dose with you     Dispense:  2 tablet     Refill:  0   • nitroglycerin (NITROSTAT) 0.4 MG SL tablet     Si under the tongue as needed for angina, may repeat q5mins for up three doses      Dispense:  30 tablet     Refill:  5           This document has been electronically signed by Kei Cartagena Jr., APRN  January 19, 2022 13:13 EST

## 2022-02-10 ENCOUNTER — OFFICE VISIT (OUTPATIENT)
Dept: CARDIOLOGY | Facility: CLINIC | Age: 57
End: 2022-02-10

## 2022-02-10 VITALS
HEART RATE: 72 BPM | HEIGHT: 71 IN | DIASTOLIC BLOOD PRESSURE: 84 MMHG | SYSTOLIC BLOOD PRESSURE: 140 MMHG | BODY MASS INDEX: 22.71 KG/M2 | WEIGHT: 162.2 LBS | OXYGEN SATURATION: 98 %

## 2022-02-10 DIAGNOSIS — R93.89 ABNORMAL COMPUTED TOMOGRAPHY ANGIOGRAPHY (CTA): ICD-10-CM

## 2022-02-10 DIAGNOSIS — I25.10 CORONARY ARTERY DISEASE INVOLVING NATIVE CORONARY ARTERY OF NATIVE HEART WITHOUT ANGINA PECTORIS: Primary | ICD-10-CM

## 2022-02-10 DIAGNOSIS — R07.2 PRECORDIAL CHEST PAIN: ICD-10-CM

## 2022-02-10 DIAGNOSIS — R06.02 SHORTNESS OF BREATH: ICD-10-CM

## 2022-02-10 PROCEDURE — 99214 OFFICE O/P EST MOD 30 MIN: CPT | Performed by: NURSE PRACTITIONER

## 2022-02-10 RX ORDER — ROSUVASTATIN CALCIUM 20 MG/1
20 TABLET, COATED ORAL DAILY
Qty: 90 TABLET | Refills: 3 | Status: SHIPPED | OUTPATIENT
Start: 2022-02-10 | End: 2022-11-15

## 2022-02-10 RX ORDER — ASPIRIN 81 MG/1
81 TABLET ORAL DAILY
Qty: 90 TABLET | Refills: 3 | Status: SHIPPED | OUTPATIENT
Start: 2022-02-10 | End: 2022-08-17

## 2022-02-10 NOTE — PATIENT INSTRUCTIONS
Acute Coronary Syndrome  Acute coronary syndrome (ACS) is a serious problem in which there is suddenly not enough blood and oxygen reaching the heart. ACS can result in chest pain or a heart attack.  This condition is a medical emergency. If you have any symptoms of this condition, get help right away.  What are the causes?  This condition may be caused by:  · A buildup of fat and cholesterol inside the arteries (atherosclerosis). This is the most common cause. The buildup (plaque) can cause blood vessels in the heart (coronary arteries) to become narrow or blocked, which reduces blood flow to the heart. Plaque can also break off and lead to a clot, which can block an artery and cause a heart attack or stroke.  · Sudden tightening of the muscles around the coronary arteries (coronary spasm).  · Tearing of a coronary artery (spontaneous coronary artery dissection).  · Very low blood pressure (hypotension).  · An abnormal heartbeat (arrhythmia).  · Other medical conditions that cause a decrease of oxygen to the heart, such as anemiaorrespiratory failure.  · Using cocaine or methamphetamine.  What increases the risk?  The following factors may make you more likely to develop this condition:  · Age. The risk for ACS increases as you get older.  · History of chest pain, heart attack, peripheral artery disease, or stroke.  · Having taken chemotherapy or immune-suppressing medicines.  · Being male.  · Family history of chest pain, heart disease, or stroke.  · Smoking.  · Not exercising enough.  · Being overweight.  · High cholesterol.  · High blood pressure (hypertension).  · Diabetes.  · Excessive alcohol use.  What are the signs or symptoms?  Common symptoms of this condition include:  · Chest pain. The pain may last a long time, or it may stop and come back (recur). It may feel like:  ? Crushing or squeezing.  ? Tightness, pressure, fullness, or heaviness.  · Arm, neck, jaw, or back pain.  · Heartburn or  indigestion.  · Shortness of breath.  · Nausea.  · Sudden cold sweats.  · Light-headedness.  · Dizziness or passing out.  · Tiredness (fatigue).  Sometimes there are no symptoms.  How is this diagnosed?  This condition may be diagnosed based on:  · Your medical history and symptoms.  · Imaging tests, such as:  ? An electrocardiogram (ECG). This measures the heart's electrical activity.  ? X-rays.  ? CT scan.  ? A coronary angiogram. For this test, dye is injected into the heart arteries and then X-rays are taken.  ? Myocardial perfusion imaging. This test shows how well blood flows through your heart muscle.  · Blood tests. These may be repeated at certain time intervals.  · Exercise stress testing.  · Echocardiogram. This is a test that uses sound waves to produce detailed images of the heart.  How is this treated?  Treatment for this condition may include:  · Oxygen therapy.  · Medicines, such as:  ? Antiplatelet medicines and blood-thinning medicines, such as aspirin. These help prevent blood clots.  ? Medicine that dissolves any blood clots (fibrinolytic therapy).  ? Blood pressure medicines.  ? Nitroglycerin. This helps widen blood vessels to improve blood flow.  ? Pain medicine.  ? Cholesterol-lowering medicine.  · Surgery, such as:  ? Coronary angioplasty with stent placement. This involves placing a small piece of metal that looks like mesh or a spring into a narrow coronary artery. This widens the artery and keeps it open.  ? Coronary artery bypass surgery. This involves taking a section of a blood vessel from a different part of your body and placing it on the blocked coronary artery to allow blood to flow around the blockage.  · Cardiac rehabilitation. This is a program that includes exercise training, education, and counseling to help you recover.  Follow these instructions at home:  Eating and drinking  · Eat a heart-healthy diet that includes whole grains, fruits and vegetables, lean proteins, and  low-fat or nonfat dairy products.  · Limit how much salt (sodium) you eat as told by your health care provider. Follow instructions from your health care provider about any other eating or drinking restrictions, such as limiting foods that are high in fat and processed sugars.  · Use healthy cooking methods such as roasting, grilling, broiling, baking, poaching, steaming, or stir-frying.  · Work with a dietitian to follow a heart-healthy eating plan.  Medicines  · Take over-the-counter and prescription medicines only as told by your health care provider.  · Do not take these medicines unless your health care provider approves:  ? Vitamin supplements that contain vitamin A or vitamin E.  ? NSAIDs, such as ibuprofen, naproxen, or celecoxib.  ? Hormone replacement therapy that contains estrogen.  · If you are taking blood thinners:  ? Talk with your health care provider before you take any medicines that contain aspirin or NSAIDs. These medicines increase your risk for dangerous bleeding.  ? Take your medicine exactly as told, at the same time every day.  ? Avoid activities that could cause injury or bruising, and follow instructions about how to prevent falls.  ? Wear a medical alert bracelet, and carry a card that lists what medicines you take.  Activity  · Follow your cardiac rehabilitation program. Do exercises as told by your physical therapist.  · Ask your health care provider what activities and exercises are safe for you. Follow his or her instructions about lifting, driving, or climbing stairs.  Lifestyle  · Do not use any products that contain nicotine or tobacco, such as cigarettes, e-cigarettes, and chewing tobacco. If you need help quitting, ask your health care provider.  · Do not drink alcohol if your health care provider tells you not to drink.  · If you drink alcohol:  ? Limit how much you have to 0-1 drink a day.  ? Be aware of how much alcohol is in your drink. In the [place] one drink equals one 12 oz  bottle of beer (355 mL), one 5 oz glass of wine (148 mL), or one 1½ oz glass of hard liquor (44 mL).  · Maintain a healthy weight. If you need to lose weight, work with your health care provider to do so safely.  General instructions  · Tell all the health care providers who provide care for you about your heart condition, including your dentist. This may affect the medicines or treatment you receive.  · Manage any other health conditions you have, such as hypertension or diabetes. These conditions affect your heart.  · Pay attention to your mental health. You may be at higher risk for depression.  ? Find ways to manage stress.  ? Talk to your health care provider about depression screening and treatment.  · Keep your vaccinations up to date.  ? Get the flu shot (influenza vaccine) every year.  ? Get the pneumococcal vaccine if you are age 65 or older.  · If directed, monitor your blood pressure at home.  · Keep all follow-up visits as told by your health care provider. This is important.  Contact a health care provider if you:  · Feel overwhelmed or sad.  · Have trouble doing your daily activities.  Get help right away if you:  · Have pain in your chest, neck, arm, jaw, stomach, or back that recurs, and:  ? It lasts for more than a few minutes.  ? It is not relieved by taking the medicineyour health care provider prescribed.  · Have unexplained:  ? Heavy sweating.  ? Heartburn or indigestion.  ? Nausea or vomiting.  ? Shortness of breath.  ? Difficulty breathing.  ? Fatigue.  ? Nervousness or anxiety.  ? Weakness.  ? Diarrhea.  ? Dark stools or blood in your stool.  · Have sudden light-headedness or dizziness.  · Have blood pressure that is higher than 180/120.  · Faint.  · Have thoughts about hurting yourself.  These symptoms may represent a serious problem that is an emergency. Do not wait to see if the symptoms will go away. Get medical help right away. Call your local emergency services (911 in the [place] Do  not drive yourself to the hospital.   Summary  · Acute coronary syndrome (ACS) is when there is not enough blood and oxygen being supplied to the heart. ACS can result in chest pain or a heart attack.  · Acute coronary syndrome is a medical emergency. If you have any symptoms of this condition, get help right away.  · Treatment includes medicines and procedures to open the blocked arteries and restore blood flow.  This information is not intended to replace advice given to you by your health care provider. Make sure you discuss any questions you have with your health care provider.  Document Revised: 05/20/2020 Document Reviewed: 12/30/2019  [email or web address] Patient Education © 2021 [email or web address]      Coronary Angiogram With Stent  Coronary angiogram with stent placement is a procedure to widen or open a narrow blood vessel of the heart (coronary artery). Arteries may become blocked by cholesterol buildup (plaques) in the lining of the artery wall. When a coronary artery becomes partially blocked, blood flow to that area decreases. This may lead to chest pain or a heart attack (myocardial infarction).  A stent is a small piece of metal that looks like mesh or spring. Stent placement may be done as treatment after a heart attack, or to prevent a heart attack if a blocked artery is found by a coronary angiogram.  Let your health care provider know about:  · Any allergies you have, including allergies to medicines or contrast dye.  · All medicines you are taking, including vitamins, herbs, eye drops, creams, and over-the-counter medicines.  · Any problems you or family members have had with anesthetic medicines.  · Any blood disorders you have.  · Any surgeries you have had.  · Any medical conditions you have, including kidney problems or kidney failure.  · Whether you are pregnant or may be pregnant.  · Whether you are breastfeeding.  What are the risks?  Generally, this is a safe procedure. However, serious problems may  occur, including:  · Damage to nearby structures or organs, such as the heart, blood vessels, or kidneys.  · A return of blockage.  · Bleeding, infection, or bruising at the insertion site.  · A collection of blood under the skin (hematoma) at the insertion site.  · A blood clot in another part of the body.  · Allergic reaction to medicines or dyes.  · Bleeding into the abdomen (retroperitoneal bleeding).  · Stroke (rare).  · Heart attack (rare).  What happens before the procedure?  Staying hydrated  Follow instructions from your health care provider about hydration, which may include:  · Up to 2 hours before the procedure - you may continue to drink clear liquids, such as water, clear fruit juice, black coffee, and plain tea.    Eating and drinking restrictions  Follow instructions from your health care provider about eating and drinking, which may include:  · 8 hours before the procedure - stop eating heavy meals or foods, such as meat, fried foods, or fatty foods.  · 6 hours before the procedure - stop eating light meals or foods, such as toast or cereal.  · 2 hours before the procedure - stop drinking clear liquids.  Medicines  Ask your health care provider about:  · Changing or stopping your regular medicines. This is especially important if you are taking diabetes medicines or blood thinners.  · Taking medicines such as aspirin and ibuprofen. These medicines can thin your blood. Do not take these medicines unless your health care provider tells you to take them.  ? Generally, aspirin is recommended before a thin tube, called a catheter, is passed through a blood vessel and inserted into the heart (cardiac catheterization).  · Taking over-the-counter medicines, vitamins, herbs, and supplements.  General instructions  · Do not use any products that contain nicotine or tobacco for at least 4 weeks before the procedure. These products include cigarettes, e-cigarettes, and chewing tobacco. If you need help  quitting, ask your health care provider.  · Plan to have someone take you home from the hospital or clinic.  · If you will be going home right after the procedure, plan to have someone with you for 24 hours.  · You may have tests and imaging procedures.  · Ask your health care provider:  ? How your insertion site will be marked. Ask which artery will be used for the procedure.  ? What steps will be taken to help prevent infection. These may include:  § Removing hair at the insertion site.  § Washing skin with a germ-killing soap.  § Taking antibiotic medicine.  What happens during the procedure?    · An IV will be inserted into one of your veins.  · Electrodes may be placed on your chest to monitor your heart rate during the procedure.  · You will be given one or more of the following:  ? A medicine to help you relax (sedative).  ? A medicine to numb the area (local anesthetic) for catheter insertion.  · A small incision will be made for catheter insertion.  · The catheter will be inserted into an artery using a guide wire. The location may be in your groin, your wrist, or the fold of your arm (near your elbow).  · An X-ray procedure (fluoroscopy) will be used to help guide the catheter to the opening of the heart arteries.  · A dye will be injected into the catheter. X-rays will be taken. The dye helps to show where any narrowing or blockages are located in the arteries.  · Tell your health care provider if you have chest pain or trouble breathing.  · A tiny wire will be guided to the blocked spot, and a balloon will be inflated to make the artery wider.  · The stent will be expanded to crush the plaques into the wall of the vessel. The stent will hold the area open and improve the blood flow. Most stents have a drug coating to reduce the risk of the stent narrowing over time.  · The artery may be made wider using a drill, laser, or other tools that remove plaques.  · The catheter will be removed when the blood  flow improves. The stent will stay where it was placed, and the lining of the artery will grow over it.  · A bandage (dressing) will be placed on the insertion site. Pressure will be applied to stop bleeding.  · The IV will be removed.  This procedure may vary among health care providers and hospitals.  What happens after the procedure?  · Your blood pressure, heart rate, breathing rate, and blood oxygen level will be monitored until you leave the hospital or clinic.  · If the procedure is done through the leg, you will lie flat in bed for a few hours or for as long as told by your health care provider. You will be instructed not to bend or cross your legs.  · The insertion site and the pulse in your foot or wrist will be checked often.  · You may have more blood tests, X-rays, and a test that records the electrical activity of your heart (electrocardiogram, or ECG).  · Do not drive for 24 hours if you were given a sedative during your procedure.  Summary  · Coronary angiogram with stent placement is a procedure to widen or open a narrowed coronary artery. This is done to treat heart problems.  · Before the procedure, let your health care provider know about all the medical conditions and surgeries you have or have had.  · This is a safe procedure. However, some problems may occur, including damage to nearby structures or organs, bleeding, blood clots, or allergies.  · Follow your health care provider's instructions about eating, drinking, medicines, and other lifestyle changes, such as quitting tobacco use before the procedure.  This information is not intended to replace advice given to you by your health care provider. Make sure you discuss any questions you have with your health care provider.  Document Revised: 07/08/2020 Document Reviewed: 07/08/2020  [employer] Patient Education © 2021 [employer]

## 2022-02-10 NOTE — PROGRESS NOTES
Subjective     Alysia Brennan is a 56 y.o. female who presents to day for Shortness of Breath (presents for Cardiac CTA f/u, Covid 3 weeks ago).    CHIEF COMPLIANT  Chief Complaint   Patient presents with   • Shortness of Breath     presents for Cardiac CTA f/u, Covid 3 weeks ago       Active Problems:  Problem List Items Addressed This Visit     None      Visit Diagnoses     Coronary artery disease involving native coronary artery of native heart without angina pectoris    -  Primary    Relevant Medications    rosuvastatin (CRESTOR) 20 MG tablet    aspirin (aspirin) 81 MG EC tablet    Abnormal computed tomography angiography (CTA)        Shortness of breath        Precordial chest pain            Problem list  1.  Chest pain  1.1 stress test 1/22: Small, mild ischemic defect confined to the inferior basilar and inferior apical segments there may be mild degree of extension into the diaphragmatic segments as well, preserved EF 56%  2.  Shortness of breath, sees Dr Caldera  2.1 echocardiogram 1/22: EF 50 to 55% questionable mild septal hypokinesis, grade 1 diastolic dysfunction, no significant valvular disease    MILES Brennan is a 56-year-old female patient who is being followed up today for shortness of breath and abnormal CTA of the heart.  Patient had 60 to 70% stenosis of her right coronary artery.  Her stress test did show a small mild ischemic defect confined to the inferior basilar inferior apical segments.  This correlates with what was found on the CTA.  However patient is not having any angina anginal symptoms this time.  She is well she is not having any problems and is doing relatively well.  She does have chronic shortness of breath that is stable and usually on occurs with increased levels of activity.  She says if she does extremely strenuous things of walking up a steep hill or as she was running on the treadmill for the stress test she did have some dyspnea.  Normal walking and activities of  daily living do not cause her to become dyspneic.  She does not have any dyspnea at rest.  She does have associated wheezing with a history of COPD.  She also has a history of diabetes mellitus and hyperlipidemia which increase her risk for coronary artery disease as well.  We did review the CTA in detail and she denied any questions.  She denies chest pain, lower extremity edema, palpitations, fatigue, dizziness, Lightheadedness, PND, orthopnea, or strokelike symptoms.  PRIOR MEDS  Current Outpatient Medications on File Prior to Visit   Medication Sig Dispense Refill   • arformoterol (BROVANA) 15 MCG/2ML nebulizer solution Take  by nebulization 2 (Two) Times a Day.     • budesonide (PULMICORT) 0.25 MG/2ML nebulizer solution Take 0.25 mg by nebulization Daily.     • cetirizine (zyrTEC) 10 MG tablet Take 10 mg by mouth Daily.     • citalopram (CeleXA) 40 MG tablet Take 40 mg by mouth Daily.     • glimepiride (AMARYL) 4 MG tablet Take 4 mg by mouth Every Morning Before Breakfast.     • metFORMIN (GLUCOPHAGE) 1000 MG tablet Take 1,000 mg by mouth 2 (Two) Times a Day With Meals.     • nitroglycerin (NITROSTAT) 0.4 MG SL tablet 1 under the tongue as needed for angina, may repeat q5mins for up three doses 30 tablet 5   • omeprazole (priLOSEC) 20 MG capsule Take 20 mg by mouth Daily.     • theophylline (EDMOND-24) 300 MG 24 hr capsule Take 300 mg by mouth Daily.     • [DISCONTINUED] simvastatin (ZOCOR) 20 MG tablet Take 20 mg by mouth Every Night.     • [DISCONTINUED] metoprolol tartrate (LOPRESSOR) 100 MG tablet Metoprolol 100mg 7am morning of cta and take second dose with you 2 tablet 0     No current facility-administered medications on file prior to visit.       ALLERGIES  Patient has no known allergies.    HISTORY  Past Medical History:   Diagnosis Date   • COPD (chronic obstructive pulmonary disease) (HCC)    • Diabetes mellitus (HCC)    • Hyperlipidemia        Social History     Socioeconomic History   • Marital  "status:    Tobacco Use   • Smoking status: Current Every Day Smoker     Years: 30.00     Types: Cigarettes   • Smokeless tobacco: Never Used   Substance and Sexual Activity   • Alcohol use: Never   • Drug use: Never       Family History   Problem Relation Age of Onset   • No Known Problems Mother    • No Known Problems Father        Review of Systems   Constitutional: Negative.  Negative for chills, diaphoresis, fatigue and fever.   HENT: Negative.    Eyes: Negative.  Negative for visual disturbance.   Respiratory: Positive for shortness of breath (on exertion) and wheezing. Negative for apnea, cough and chest tightness.         COPD   Cardiovascular: Negative.  Negative for chest pain, palpitations and leg swelling.   Gastrointestinal: Negative.  Negative for abdominal pain, blood in stool, constipation, diarrhea, nausea and vomiting.   Endocrine: Negative.    Genitourinary: Negative.  Negative for hematuria.   Musculoskeletal: Negative.  Negative for arthralgias, back pain, myalgias and neck pain.   Skin: Negative.    Allergic/Immunologic: Negative.    Neurological: Negative for dizziness, syncope, weakness, light-headedness, numbness and headaches.   Hematological: Bruises/bleeds easily (bruise easy).   Psychiatric/Behavioral: Negative.  Negative for sleep disturbance.       Objective     VITALS: /84 (BP Location: Right arm, Patient Position: Sitting)   Pulse 72   Ht 180.3 cm (70.98\")   Wt 73.6 kg (162 lb 3.2 oz)   SpO2 98%   BMI 22.63 kg/m²     LABS:   Lab Results (most recent)     None          IMAGING:   No Images in the past 120 days found..    EXAM:  Physical Exam  Vitals and nursing note reviewed.   Constitutional:       Appearance: She is well-developed.   HENT:      Head: Normocephalic.   Eyes:      Pupils: Pupils are equal, round, and reactive to light.   Neck:      Thyroid: No thyroid mass.      Vascular: No carotid bruit or JVD.      Trachea: Trachea and phonation normal. "   Cardiovascular:      Rate and Rhythm: Normal rate and regular rhythm.      Pulses:           Radial pulses are 2+ on the right side and 2+ on the left side.        Posterior tibial pulses are 2+ on the right side and 2+ on the left side.      Heart sounds: Normal heart sounds. No murmur heard.  No friction rub. No gallop.    Pulmonary:      Effort: Pulmonary effort is normal. No respiratory distress.      Breath sounds: Normal breath sounds. No wheezing or rales.   Abdominal:      General: Bowel sounds are normal.      Palpations: Abdomen is soft.   Musculoskeletal:         General: No swelling. Normal range of motion.      Cervical back: Neck supple.   Skin:     General: Skin is warm and dry.      Capillary Refill: Capillary refill takes less than 2 seconds.      Findings: No rash.   Neurological:      Mental Status: She is alert and oriented to person, place, and time.   Psychiatric:         Speech: Speech normal.         Behavior: Behavior normal.         Thought Content: Thought content normal.         Judgment: Judgment normal.         Procedure   Procedures       Assessment/Plan    Diagnosis Plan   1. Coronary artery disease involving native coronary artery of native heart without angina pectoris  rosuvastatin (CRESTOR) 20 MG tablet    aspirin (aspirin) 81 MG EC tablet   2. Abnormal computed tomography angiography (CTA)     3. Shortness of breath     4. Precordial chest pain     1.  Patient does seem to have coronary artery disease of 60 to 70% of the right coronary artery.  This corresponds with her stress test with inferior basilar and inferior ischemia.  We did discuss moving forth with left heart catheterization.  Patient reports that she is doing relatively well and it was decided to defer left heart catheterization at this time and move forth with medical management.  We will stop her simvastatin and transition her to rosuvastatin 20 mg daily as well as aspirin 81 mg daily.  She is informed to notify  me immediately if any symptoms occur or if she changes her mind wants to move forth with left heart catheterization.  2.  Patient shortness of breath is stable and only occurs with increased levels of activity.  Ultimately she reports she is doing well and denies any problems.  3.  We will get patient a copy of her CTA.  She can discuss with her PCP.  She will notify me if she wishes to move forth.  4.  Informed of signs and symptoms of ACS and advised to seek emergent treatment for any new worsening symptoms.  Patient also advised sooner follow-up as needed.  Also advised to follow-up with family doctor as needed  This note is dictated utilizing voice recognition software.  Although this record has been proof read, transcriptional errors may still be present. If questions occur regarding the content of this record please do not hesitate to call our office.  I have reviewed and confirmed the accuracy of the ROS as documented by the MA/LPN/RN TRAVIS Joseph    Return in about 8 weeks (around 4/7/2022), or if symptoms worsen or fail to improve.    Diagnoses and all orders for this visit:    1. Coronary artery disease involving native coronary artery of native heart without angina pectoris (Primary)  -     rosuvastatin (CRESTOR) 20 MG tablet; Take 1 tablet by mouth Daily.  Dispense: 90 tablet; Refill: 3  -     aspirin (aspirin) 81 MG EC tablet; Take 1 tablet by mouth Daily.  Dispense: 90 tablet; Refill: 3    2. Abnormal computed tomography angiography (CTA)    3. Shortness of breath    4. Precordial chest pain        Advance Care Planning   ACP discussion was held with the patient during this visit. Patient does not have an advance directive, declines further assistance.       MEDS ORDERED DURING VISIT:  New Medications Ordered This Visit   Medications   • rosuvastatin (CRESTOR) 20 MG tablet     Sig: Take 1 tablet by mouth Daily.     Dispense:  90 tablet     Refill:  3   • aspirin (aspirin) 81 MG EC tablet      Sig: Take 1 tablet by mouth Daily.     Dispense:  90 tablet     Refill:  3           This document has been electronically signed by Kei Cartagena Jr., APRN  February 10, 2022 11:35 EST

## 2022-02-22 ENCOUNTER — TELEPHONE (OUTPATIENT)
Dept: CARDIOLOGY | Facility: CLINIC | Age: 57
End: 2022-02-22

## 2022-02-22 DIAGNOSIS — R06.02 SHORTNESS OF BREATH: ICD-10-CM

## 2022-02-22 DIAGNOSIS — R07.2 PRECORDIAL CHEST PAIN: ICD-10-CM

## 2022-02-22 DIAGNOSIS — R94.39 POSITIVE CARDIAC STRESS TEST: ICD-10-CM

## 2022-02-22 DIAGNOSIS — I25.10 CORONARY ARTERY DISEASE INVOLVING NATIVE CORONARY ARTERY OF NATIVE HEART WITHOUT ANGINA PECTORIS: ICD-10-CM

## 2022-02-22 DIAGNOSIS — R93.89 ABNORMAL COMPUTED TOMOGRAPHY ANGIOGRAPHY (CTA): Primary | ICD-10-CM

## 2022-02-22 RX ORDER — CLOPIDOGREL BISULFATE 75 MG/1
75 TABLET ORAL DAILY
Qty: 30 TABLET | Refills: 11 | Status: SHIPPED | OUTPATIENT
Start: 2022-02-22 | End: 2022-03-11

## 2022-02-22 NOTE — TELEPHONE ENCOUNTER
Patient came into the office stating she has decided to have a heart cath done and would like to have it scheduled if her ins company covers it. Told patient I would let JR know and we would call her back regarding it. Thank you!

## 2022-02-22 NOTE — TELEPHONE ENCOUNTER
[facility] and Plavix ordered by JR Cartagena. Patient request cath done sooner than later as long as it is done in [place]. Advised she will receive a call from scheduling with date, time and any specific instructions. Lesly Dooley MA      Attempted to call patient however no voicemail available.     Next cath date with Dr Maria not until 3/23 however she can have done sooner if she would like to have done by another provider.     Kei Cartagena, TRAVIS Dooley, Lesly Monet MA  She did not have a high risk study on the CTA or the stress test.  If she would like to have Dr. Maria perform it and March that is okay with me.  Or if she wants it done sooner with Emiliana or Serge or Mark, okay with that to as long as she is

## 2022-03-11 ENCOUNTER — OFFICE VISIT (OUTPATIENT)
Dept: CARDIOLOGY | Facility: CLINIC | Age: 57
End: 2022-03-11

## 2022-03-11 VITALS
WEIGHT: 161 LBS | HEART RATE: 76 BPM | HEIGHT: 71 IN | SYSTOLIC BLOOD PRESSURE: 124 MMHG | BODY MASS INDEX: 22.54 KG/M2 | DIASTOLIC BLOOD PRESSURE: 69 MMHG | OXYGEN SATURATION: 97 %

## 2022-03-11 DIAGNOSIS — R06.02 SHORTNESS OF BREATH: ICD-10-CM

## 2022-03-11 DIAGNOSIS — Z98.890 STATUS POST LEFT HEART CATHETERIZATION: Primary | ICD-10-CM

## 2022-03-11 DIAGNOSIS — I25.10 CORONARY ARTERY DISEASE INVOLVING NATIVE CORONARY ARTERY OF NATIVE HEART WITHOUT ANGINA PECTORIS: ICD-10-CM

## 2022-03-11 PROCEDURE — 99214 OFFICE O/P EST MOD 30 MIN: CPT | Performed by: NURSE PRACTITIONER

## 2022-03-11 RX ORDER — SIMVASTATIN 20 MG
TABLET ORAL
COMMUNITY
Start: 2022-02-22 | End: 2022-03-11 | Stop reason: ALTCHOICE

## 2022-03-11 RX ORDER — THEOPHYLLINE 300 MG/1
300 TABLET, EXTENDED RELEASE ORAL DAILY
COMMUNITY
Start: 2022-02-22 | End: 2022-03-11 | Stop reason: SDUPTHER

## 2022-03-11 RX ORDER — METOPROLOL SUCCINATE 25 MG/1
25 TABLET, EXTENDED RELEASE ORAL DAILY
Qty: 45 TABLET | Refills: 3 | Status: SHIPPED | OUTPATIENT
Start: 2022-03-11 | End: 2022-07-19

## 2022-03-11 RX ORDER — TICAGRELOR 90 MG/1
90 TABLET ORAL 2 TIMES DAILY
COMMUNITY
Start: 2022-02-28 | End: 2022-06-15 | Stop reason: SDUPTHER

## 2022-03-11 RX ORDER — AMLODIPINE BESYLATE 5 MG/1
5 TABLET ORAL DAILY
COMMUNITY
Start: 2022-02-28 | End: 2022-06-15 | Stop reason: SDUPTHER

## 2022-03-11 NOTE — PROGRESS NOTES
Subjective     Alysia Brennan is a 56 y.o. female who presents to day for Follow-up (Cardiac management-follow up [facility] with stent by Dr. Gamble on 02/28/2022. Patient reports that she has not had any chest pain since stent placement. She does report being SOA but attributes this to COPD. Patient brought a blood pressure log with her today. No medication refills needed at this time.).    CHIEF COMPLIANT  Chief Complaint   Patient presents with   • Follow-up     Cardiac management-follow up [facility] with stent by Dr. Gamble on 02/28/2022. Patient reports that she has not had any chest pain since stent placement. She does report being SOA but attributes this to COPD. Patient brought a blood pressure log with her today. No medication refills needed at this time.       Active Problems:  Problem List Items Addressed This Visit    None     Visit Diagnoses     Status post left heart catheterization    -  Primary    Coronary artery disease involving native coronary artery of native heart without angina pectoris        Relevant Medications    amLODIPine (NORVASC) 5 MG tablet    Brilinta 90 MG tablet tablet    metoprolol succinate XL (TOPROL-XL) 25 MG 24 hr tablet    Shortness of breath            Problem list  1.  Chest pain  1.1 left heart catheterization 2/22: Left main normal, LAD normal, circumflex normal, 70% long diffuse calcified disease in the RCA with stent placement of 3.5 x 38 mm drug-eluting stent  2.  Shortness of breath, sees Dr Caldera  2.1 echocardiogram 1/22: EF 50 to 55% questionable mild septal hypokinesis, grade 1 diastolic dysfunction, no significant valvular disease    HPI  HPI  Ms. Brennan is a 56-year-old female patient who is being followed up today status post left heart catheterization via the right radial access site.  Patient denies any complications of the right radial access site such as numbness or tingling of her right upper extremity.  She was found to have approximately 70% stenosis in her right  coronary artery in which she did receive a stent of 3.5 x 38 mm.  She is on dual antiplatelet therapy with Brilinta and aspirin.  She will continue antianginal therapy and metoprolol and amlodipine.  She will also continue high intensity statin therapy of rosuvastatin 20 mg daily.  She continues to have chronic shortness of breath in which she does have a history of COPD and followed by Dr. Verenice Farrell.  She does have associated wheezing and coughing.  It occurs both with rest and exertion.  She denies chest pain, lower extremity edema, palpitations, dizziness, lightheadedness, syncope, PND, orthopnea, or strokelike symptoms.  We did review her left heart catheterization in detail she denied any questions.  PRIOR MEDS  Current Outpatient Medications on File Prior to Visit   Medication Sig Dispense Refill   • amLODIPine (NORVASC) 5 MG tablet Take 5 mg by mouth Daily.     • arformoterol (BROVANA) 15 MCG/2ML nebulizer solution Take  by nebulization 2 (Two) Times a Day.     • aspirin (aspirin) 81 MG EC tablet Take 1 tablet by mouth Daily. 90 tablet 3   • Brilinta 90 MG tablet tablet Take 90 mg by mouth 2 (Two) Times a Day.     • budesonide (PULMICORT) 0.25 MG/2ML nebulizer solution Take 0.25 mg by nebulization Daily.     • cetirizine (zyrTEC) 10 MG tablet Take 10 mg by mouth Daily.     • citalopram (CeleXA) 40 MG tablet Take 40 mg by mouth Daily.     • glimepiride (AMARYL) 4 MG tablet Take 4 mg by mouth Every Morning Before Breakfast.     • metFORMIN (GLUCOPHAGE) 1000 MG tablet Take 1,000 mg by mouth 2 (Two) Times a Day With Meals.     • nitroglycerin (NITROSTAT) 0.4 MG SL tablet 1 under the tongue as needed for angina, may repeat q5mins for up three doses 30 tablet 5   • rosuvastatin (CRESTOR) 20 MG tablet Take 1 tablet by mouth Daily. 90 tablet 3   • theophylline (EDMOND-24) 300 MG 24 hr capsule Take 300 mg by mouth Daily.       No current facility-administered medications on file prior to visit.  "      ALLERGIES  Patient has no known allergies.    HISTORY  Past Medical History:   Diagnosis Date   • COPD (chronic obstructive pulmonary disease) (HCC)    • Diabetes mellitus (HCC)    • Hyperlipidemia        Social History     Socioeconomic History   • Marital status:    Tobacco Use   • Smoking status: Current Every Day Smoker     Years: 30.00     Types: Cigarettes   • Smokeless tobacco: Never Used   Substance and Sexual Activity   • Alcohol use: Never   • Drug use: Never       Family History   Problem Relation Age of Onset   • No Known Problems Mother    • No Known Problems Father        Review of Systems   Constitutional: Negative for chills, fatigue and fever.   HENT: Positive for congestion and rhinorrhea (allergies). Negative for sore throat.    Eyes: Negative for visual disturbance.   Respiratory: Positive for cough (COPD), shortness of breath (COPD) and wheezing (COPD).    Cardiovascular: Negative for chest pain, palpitations and leg swelling.   Gastrointestinal: Negative for diarrhea, nausea and vomiting.   Endocrine: Positive for cold intolerance. Negative for heat intolerance.   Genitourinary: Negative for difficulty urinating.   Musculoskeletal: Negative for arthralgias, back pain, joint swelling and neck pain.   Skin: Negative.    Allergic/Immunologic: Positive for environmental allergies (seasonal).   Neurological: Negative for dizziness, weakness and headaches.   Hematological: Bruises/bleeds easily.   Psychiatric/Behavioral: Negative for behavioral problems and sleep disturbance. The patient is not nervous/anxious.        Objective     VITALS: /69 (BP Location: Left arm, Patient Position: Sitting, Cuff Size: Adult)   Pulse 76   Ht 180.3 cm (71\")   Wt 73 kg (161 lb)   SpO2 97%   BMI 22.45 kg/m²     LABS:   Lab Results (most recent)     None          IMAGING:   No Images in the past 120 days found..    EXAM:  Physical Exam  Vitals and nursing note reviewed.   Constitutional:       " Appearance: She is well-developed.   HENT:      Head: Normocephalic.   Eyes:      Pupils: Pupils are equal, round, and reactive to light.   Neck:      Thyroid: No thyroid mass.      Vascular: No carotid bruit or JVD.      Trachea: Trachea and phonation normal.   Cardiovascular:      Rate and Rhythm: Normal rate and regular rhythm.      Pulses:           Radial pulses are 2+ on the right side and 2+ on the left side.        Posterior tibial pulses are 2+ on the right side and 2+ on the left side.      Heart sounds: Normal heart sounds. No murmur heard.    No friction rub. No gallop.   Pulmonary:      Effort: Pulmonary effort is normal. No respiratory distress.      Breath sounds: Normal breath sounds. No wheezing or rales.   Abdominal:      General: Bowel sounds are normal.      Palpations: Abdomen is soft.   Musculoskeletal:         General: No swelling. Normal range of motion.      Cervical back: Neck supple.   Skin:     General: Skin is warm and dry.      Capillary Refill: Capillary refill takes less than 2 seconds.      Findings: No rash.   Neurological:      Mental Status: She is alert and oriented to person, place, and time.   Psychiatric:         Speech: Speech normal.         Behavior: Behavior normal.         Thought Content: Thought content normal.         Judgment: Judgment normal.         Procedure   Procedures       Assessment/Plan    Diagnosis Plan   1. Status post left heart catheterization     2. Coronary artery disease involving native coronary artery of native heart without angina pectoris  metoprolol succinate XL (TOPROL-XL) 25 MG 24 hr tablet   3. Shortness of breath     1.  Right radial cath site is without hematoma or exudate.  Right radial pulse is palpable proximal and distal to the insertion site.  Patient denies any loss of sensation, numbness, or tingling.  Capillary refill within 2 seconds.  Patient did receive a stent to her right coronary artery in which she will be on dual antiplatelet  therapy for at least a year including aspirin and Brilinta.  High intensity statin therapy of rosuvastatin.  2.  After stent placement to right coronary artery patient had no significant residual disease.  She is on guideline driven medication therapy of Brilinta aspirin and rosuvastatin.  She denies chest pain at this time.  We will continue to monitor.  3.  Patient does have chronic shortness of breath is unchanged nonprogressive.  She is also followed by pulmonology due to her history of COPD.  4.  Informed of signs and symptoms of ACS and advised to seek emergent treatment for any new worsening symptoms.  Patient also advised sooner follow-up as needed.  Also advised to follow-up with family doctor as needed  This note is dictated utilizing voice recognition software.  Although this record has been proof read, transcriptional errors may still be present. If questions occur regarding the content of this record please do not hesitate to call our office.  I have reviewed and confirmed the accuracy of the ROS as documented by the MA/ISAACN/RN TRAVIS Joseph    Return in about 3 months (around 6/11/2022), or if symptoms worsen or fail to improve.    Diagnoses and all orders for this visit:    1. Status post left heart catheterization (Primary)    2. Coronary artery disease involving native coronary artery of native heart without angina pectoris  -     metoprolol succinate XL (TOPROL-XL) 25 MG 24 hr tablet; Take 1 tablet by mouth Daily.  Dispense: 45 tablet; Refill: 3    3. Shortness of breath             MEDS ORDERED DURING VISIT:  New Medications Ordered This Visit   Medications   • metoprolol succinate XL (TOPROL-XL) 25 MG 24 hr tablet     Sig: Take 1 tablet by mouth Daily.     Dispense:  45 tablet     Refill:  3           This document has been electronically signed by TRAVIS Joseph Jr.  March 24, 2022 22:58 EDT

## 2022-05-12 ENCOUNTER — TELEPHONE (OUTPATIENT)
Dept: CARDIOLOGY | Facility: CLINIC | Age: 57
End: 2022-05-12

## 2022-05-12 NOTE — TELEPHONE ENCOUNTER
Received cardiac clearance request from  stating pt has #15 tooth extraction scheduled for 06/06/2022 and is requiring a cardiac clearance. Placed cardiac clearance request in Brenda's inbox to review and address with provider.

## 2022-06-15 ENCOUNTER — OFFICE VISIT (OUTPATIENT)
Dept: CARDIOLOGY | Facility: CLINIC | Age: 57
End: 2022-06-15

## 2022-06-15 VITALS
OXYGEN SATURATION: 98 % | SYSTOLIC BLOOD PRESSURE: 110 MMHG | HEART RATE: 65 BPM | DIASTOLIC BLOOD PRESSURE: 72 MMHG | HEIGHT: 71 IN | WEIGHT: 157 LBS | BODY MASS INDEX: 21.98 KG/M2

## 2022-06-15 DIAGNOSIS — I25.10 CORONARY ARTERY DISEASE INVOLVING NATIVE CORONARY ARTERY OF NATIVE HEART WITHOUT ANGINA PECTORIS: Primary | ICD-10-CM

## 2022-06-15 DIAGNOSIS — I10 PRIMARY HYPERTENSION: ICD-10-CM

## 2022-06-15 DIAGNOSIS — R06.02 SHORTNESS OF BREATH: ICD-10-CM

## 2022-06-15 PROCEDURE — 99214 OFFICE O/P EST MOD 30 MIN: CPT | Performed by: NURSE PRACTITIONER

## 2022-06-15 RX ORDER — CLOPIDOGREL BISULFATE 75 MG/1
75 TABLET ORAL DAILY
Qty: 30 TABLET | Refills: 8 | Status: SHIPPED | OUTPATIENT
Start: 2022-06-15

## 2022-06-15 RX ORDER — AMLODIPINE BESYLATE 5 MG/1
5 TABLET ORAL DAILY
Qty: 90 TABLET | Refills: 3 | Status: SHIPPED | OUTPATIENT
Start: 2022-06-15

## 2022-06-15 NOTE — PROGRESS NOTES
Subjective     Aylsia Brennan is a 56 y.o. female who presents to day for 3 month follow up , COPD, and Chest Pain.    CHIEF COMPLIANT  Chief Complaint   Patient presents with   • 3 month follow up    • COPD   • Chest Pain       Active Problems:  Problem List Items Addressed This Visit    None     Visit Diagnoses     Coronary artery disease involving native coronary artery of native heart without angina pectoris    -  Primary    Relevant Medications    clopidogrel (PLAVIX) 75 MG tablet    amLODIPine (NORVASC) 5 MG tablet    Shortness of breath        Primary hypertension        Relevant Medications    amLODIPine (NORVASC) 5 MG tablet        Problem list  1.  Chest pain  1.1 left heart catheterization 2/22: Left main normal, LAD normal, circumflex normal, 70% long diffuse calcified disease in the RCA with stent placement of 3.5 x 38 mm drug-eluting stent  2.  Shortness of breath, sees Dr Caldera  2.1 echocardiogram 1/22: EF 50 to 55% questionable mild septal hypokinesis, grade 1 diastolic dysfunction, no significant valvular disease  HPI  HPI  Ms. Brennan is a 56-year-old female patient who is being followed up today for coronary artery disease.  Patient does have a history of coronary artery disease in which she received a stent to the RCA in February 2022.  She is on dual antiplatelet therapy of aspirin and Brilinta.  She is on high intensity statin therapy of rosuvastatin 20 mg daily.  She is also on antianginal therapy of amlodipine.  She does have a history of chronic arterial hypertension in which her blood pressure is well controlled today at 110/70.  She is currently being treated with amlodipine and metoprolol.  She does report some shortness of breath that occurs with activity and at rest.  She says that she gets short of breath when she is carrying something or gets in a hurry.  She does have a history of COPD.  She does have some lower extremity edema that is mainly confined to her feet and is mild in nature.   She also has some dizziness that occurs intermittently especially with position changes.  She is scheduled to follow-up with her PCP in August to have routine blood work.  Did request a copy of these labs to be sent to us.  She does complain that she is bruising a lot easier and points to multiple bruises on her arms.  She denies any chest pain, palpitations, fatigue, lightheadedness, syncope, PND, orthopnea, or strokelike symptoms.              PRIOR MEDS  Current Outpatient Medications on File Prior to Visit   Medication Sig Dispense Refill   • arformoterol (BROVANA) 15 MCG/2ML nebulizer solution Take  by nebulization 2 (Two) Times a Day.     • aspirin (aspirin) 81 MG EC tablet Take 1 tablet by mouth Daily. 90 tablet 3   • budesonide (PULMICORT) 0.25 MG/2ML nebulizer solution Take 0.25 mg by nebulization Daily.     • cetirizine (zyrTEC) 10 MG tablet Take 10 mg by mouth Daily.     • citalopram (CeleXA) 20 MG tablet Take 20 mg by mouth Daily.     • glimepiride (AMARYL) 4 MG tablet Take 4 mg by mouth Every Morning Before Breakfast.     • metFORMIN (GLUCOPHAGE) 1000 MG tablet Take 1,000 mg by mouth 2 (Two) Times a Day With Meals.     • metoprolol succinate XL (TOPROL-XL) 25 MG 24 hr tablet Take 1 tablet by mouth Daily. 45 tablet 3   • rosuvastatin (CRESTOR) 20 MG tablet Take 1 tablet by mouth Daily. 90 tablet 3   • theophylline (EDMOND-24) 300 MG 24 hr capsule Take 300 mg by mouth Daily.     • nitroglycerin (NITROSTAT) 0.4 MG SL tablet 1 under the tongue as needed for angina, may repeat q5mins for up three doses 30 tablet 5     No current facility-administered medications on file prior to visit.       ALLERGIES  Patient has no known allergies.    HISTORY  Past Medical History:   Diagnosis Date   • COPD (chronic obstructive pulmonary disease) (HCC)    • Diabetes mellitus (HCC)    • Hyperlipidemia        Social History     Socioeconomic History   • Marital status:    Tobacco Use   • Smoking status: Current Every  "Day Smoker     Years: 30.00     Types: Cigarettes   • Smokeless tobacco: Never Used   Substance and Sexual Activity   • Alcohol use: Never   • Drug use: Never       Family History   Problem Relation Age of Onset   • No Known Problems Mother    • No Known Problems Father        Review of Systems   Constitutional: Negative for chills, fatigue and fever.   HENT: Negative for congestion, rhinorrhea and sore throat.    Respiratory: Positive for shortness of breath. Negative for chest tightness.    Cardiovascular: Positive for leg swelling. Negative for chest pain and palpitations.   Gastrointestinal: Positive for nausea. Negative for constipation and diarrhea.   Musculoskeletal: Negative for arthralgias, back pain and neck pain.   Allergic/Immunologic: Positive for environmental allergies. Negative for food allergies.   Neurological: Positive for dizziness. Negative for syncope, weakness and light-headedness.   Hematological: Bruises/bleeds easily (bruising a lot on her arms).   Psychiatric/Behavioral: Negative for sleep disturbance.       Objective     VITALS: /72 (BP Location: Left arm, Patient Position: Sitting)   Pulse 65   Ht 180.3 cm (70.98\")   Wt 71.2 kg (157 lb)   SpO2 98%   BMI 21.91 kg/m²     LABS:   Lab Results (most recent)     None          IMAGING:   No Images in the past 120 days found..    EXAM:  Physical Exam  Vitals and nursing note reviewed.   Constitutional:       Appearance: She is well-developed.   HENT:      Head: Normocephalic.   Eyes:      Pupils: Pupils are equal, round, and reactive to light.   Neck:      Thyroid: No thyroid mass.      Vascular: No carotid bruit or JVD.      Trachea: Trachea and phonation normal.   Cardiovascular:      Rate and Rhythm: Normal rate and regular rhythm.      Pulses:           Radial pulses are 2+ on the right side and 2+ on the left side.        Posterior tibial pulses are 2+ on the right side and 2+ on the left side.      Heart sounds: Normal heart " sounds. No murmur heard.    No friction rub. No gallop.   Pulmonary:      Effort: Pulmonary effort is normal. No respiratory distress.      Breath sounds: Normal breath sounds. No wheezing or rales.   Abdominal:      General: Bowel sounds are normal.      Palpations: Abdomen is soft.   Musculoskeletal:         General: No swelling. Normal range of motion.      Cervical back: Neck supple.   Skin:     General: Skin is warm and dry.      Capillary Refill: Capillary refill takes less than 2 seconds.      Findings: No rash.   Neurological:      Mental Status: She is alert and oriented to person, place, and time.   Psychiatric:         Speech: Speech normal.         Behavior: Behavior normal.         Thought Content: Thought content normal.         Judgment: Judgment normal.         Procedure   Procedures       Assessment & Plan    Diagnosis Plan   1. Coronary artery disease involving native coronary artery of native heart without angina pectoris  clopidogrel (PLAVIX) 75 MG tablet    amLODIPine (NORVASC) 5 MG tablet   2. Shortness of breath     3. Primary hypertension     1.  Patient does have a history of coronary artery disease but she is on guideline driven medication therapy.  She is on dual antiplatelet therapy of aspirin and Brilinta.  However she reports significant bruising.  We will switch patient from Brilinta to Plavix to continue her dual antiplatelet therapy.  2.  Patient's shortness of breath is stable nonprogressive.  We will continue to monitor at this time.  3.  Patient's blood pressure is controlled on current blood pressure medication regimen.  No medication changes are warranted at this time.  Patient advised to monitor blood pressure on a daily basis and report any persistent highs or lows.  Set goal blood pressure for patient at 130/80 or below.  4.  Informed of signs and symptoms of ACS and advised to seek emergent treatment for any new worsening symptoms.  Patient also advised sooner follow-up as  needed.  Also advised to follow-up with family doctor as needed  This note is dictated utilizing voice recognition software.  Although this record has been proof read, transcriptional errors may still be present. If questions occur regarding the content of this record please do not hesitate to call our office.  I have reviewed and confirmed the accuracy of the ROS as documented by the MA/LPN/RN TRAVIS Joseph    Return in about 6 months (around 12/15/2022), or if symptoms worsen or fail to improve.    Diagnoses and all orders for this visit:    1. Coronary artery disease involving native coronary artery of native heart without angina pectoris (Primary)  -     clopidogrel (PLAVIX) 75 MG tablet; Take 1 tablet by mouth Daily.  Dispense: 30 tablet; Refill: 8  -     amLODIPine (NORVASC) 5 MG tablet; Take 1 tablet by mouth Daily.  Dispense: 90 tablet; Refill: 3    2. Shortness of breath    3. Primary hypertension        Alysia Brennan  reports that she has been smoking cigarettes. She has smoked for the past 30.00 years. She has never used smokeless tobacco.. I have educated her on the risk of diseases from using tobacco products.       MEDS ORDERED DURING VISIT:  New Medications Ordered This Visit   Medications   • clopidogrel (PLAVIX) 75 MG tablet     Sig: Take 1 tablet by mouth Daily.     Dispense:  30 tablet     Refill:  8   • amLODIPine (NORVASC) 5 MG tablet     Sig: Take 1 tablet by mouth Daily.     Dispense:  90 tablet     Refill:  3           This document has been electronically signed by TRAVIS Joseph Jr.  June 20, 2022 21:24 EDT

## 2022-07-18 DIAGNOSIS — I25.10 CORONARY ARTERY DISEASE INVOLVING NATIVE CORONARY ARTERY OF NATIVE HEART WITHOUT ANGINA PECTORIS: ICD-10-CM

## 2022-07-19 RX ORDER — METOPROLOL SUCCINATE 25 MG/1
TABLET, EXTENDED RELEASE ORAL
Qty: 30 TABLET | Refills: 3 | Status: SHIPPED | OUTPATIENT
Start: 2022-07-19 | End: 2022-11-15

## 2022-08-17 DIAGNOSIS — I25.10 CORONARY ARTERY DISEASE INVOLVING NATIVE CORONARY ARTERY OF NATIVE HEART WITHOUT ANGINA PECTORIS: ICD-10-CM

## 2022-08-17 RX ORDER — ASPIRIN 81 MG/1
TABLET, COATED ORAL
Qty: 90 TABLET | Refills: 3 | Status: SHIPPED | OUTPATIENT
Start: 2022-08-17

## 2022-09-01 ENCOUNTER — TELEPHONE (OUTPATIENT)
Dept: CARDIOLOGY | Facility: CLINIC | Age: 57
End: 2022-09-01

## 2022-09-01 NOTE — TELEPHONE ENCOUNTER
Received cardiac clearance request from  stating pt has tooth extraction to be scheduled and is requiring a cardiac clearance. Placed cardiac clearance request in Brenda's inbox to review and address with provider.

## 2022-09-06 ENCOUNTER — TELEPHONE (OUTPATIENT)
Dept: CARDIOLOGY | Facility: CLINIC | Age: 57
End: 2022-09-06

## 2022-09-06 NOTE — TELEPHONE ENCOUNTER
Received clearance from [facility] for pt to have tooth extraction on an undetermined date. Placed in Brenda's box to review w/ .

## 2022-10-03 ENCOUNTER — TELEPHONE (OUTPATIENT)
Dept: CARDIOLOGY | Facility: CLINIC | Age: 57
End: 2022-10-03

## 2022-10-03 DIAGNOSIS — R07.2 PRECORDIAL CHEST PAIN: Primary | ICD-10-CM

## 2022-10-03 DIAGNOSIS — R00.2 PALPITATIONS: ICD-10-CM

## 2022-10-03 DIAGNOSIS — I20.9 ANGINA PECTORIS: ICD-10-CM

## 2022-10-03 NOTE — TELEPHONE ENCOUNTER
Patient called said that she is having chest pain off and on . She has had pain into Left arm once. She wants to be seen in the office. She is having minimal pain now. Blood pressure is 128/73 hrt 69. Will forward to provider and see if we can do nurse Visit for EKG .       Brenda HORNE

## 2022-10-04 RX ORDER — RANOLAZINE 500 MG/1
500 TABLET, EXTENDED RELEASE ORAL 2 TIMES DAILY
Qty: 60 TABLET | Refills: 11 | Status: SHIPPED | OUTPATIENT
Start: 2022-10-04

## 2022-10-04 NOTE — TELEPHONE ENCOUNTER
Called patient advised of addition of Ranexa 500 mg. Patient was given a nurse visit for 10/5/22 @ 3:00  For EKG. Patient was advised that if she had worsening symptoms in the mean time to go to the ER.    Brenda HORNE

## 2022-10-05 ENCOUNTER — CLINICAL SUPPORT (OUTPATIENT)
Dept: CARDIOLOGY | Facility: CLINIC | Age: 57
End: 2022-10-05

## 2022-10-05 VITALS
BODY MASS INDEX: 21.98 KG/M2 | DIASTOLIC BLOOD PRESSURE: 73 MMHG | SYSTOLIC BLOOD PRESSURE: 118 MMHG | WEIGHT: 157 LBS | OXYGEN SATURATION: 98 % | HEART RATE: 65 BPM | HEIGHT: 71 IN

## 2022-10-05 DIAGNOSIS — R07.2 PRECORDIAL CHEST PAIN: Primary | ICD-10-CM

## 2022-10-05 DIAGNOSIS — I10 PRIMARY HYPERTENSION: ICD-10-CM

## 2022-10-05 PROCEDURE — 93005 ELECTROCARDIOGRAM TRACING: CPT | Performed by: NURSE PRACTITIONER

## 2022-10-05 PROCEDURE — 99211 OFF/OP EST MAY X REQ PHY/QHP: CPT | Performed by: NURSE PRACTITIONER

## 2022-10-05 RX ORDER — NITROGLYCERIN 0.4 MG/1
TABLET SUBLINGUAL
Qty: 30 TABLET | Refills: 5 | Status: SHIPPED | OUTPATIENT
Start: 2022-10-05

## 2022-10-05 RX ORDER — MULTIPLE VITAMINS W/ MINERALS TAB 9MG-400MCG
1 TAB ORAL DAILY
COMMUNITY

## 2022-10-05 RX ORDER — VARENICLINE TARTRATE 1 MG/1
1 TABLET, FILM COATED ORAL 2 TIMES DAILY
COMMUNITY
Start: 2022-10-04

## 2022-10-05 NOTE — PROGRESS NOTES
Alysia Brennan  1965  10/5/2022   ?   Chief Complaint   Patient presents with   • Nurse Visit- chest pain on and off x one month      ?   HPI:   ? Patient presents today with complaints of chest pain on and off  X one month.   ?   ?     Current Outpatient Medications:   •  amLODIPine (NORVASC) 5 MG tablet, Take 1 tablet by mouth Daily., Disp: 90 tablet, Rfl: 3  •  arformoterol (BROVANA) 15 MCG/2ML nebulizer solution, Take  by nebulization 2 (Two) Times a Day., Disp: , Rfl:   •  Aspirin Low Dose 81 MG EC tablet, TAKE 1 TABLET DAILY, Disp: 90 tablet, Rfl: 3  •  budesonide (PULMICORT) 0.25 MG/2ML nebulizer solution, Take 0.25 mg by nebulization Daily., Disp: , Rfl:   •  cetirizine (zyrTEC) 10 MG tablet, Take 10 mg by mouth Daily., Disp: , Rfl:   •  citalopram (CeleXA) 20 MG tablet, Take 20 mg by mouth Daily., Disp: , Rfl:   •  clopidogrel (PLAVIX) 75 MG tablet, Take 1 tablet by mouth Daily., Disp: 30 tablet, Rfl: 8  •  glimepiride (AMARYL) 4 MG tablet, Take 4 mg by mouth Every Morning Before Breakfast., Disp: , Rfl:   •  metFORMIN (GLUCOPHAGE) 1000 MG tablet, Take 1,000 mg by mouth 2 (Two) Times a Day With Meals., Disp: , Rfl:   •  metoprolol succinate XL (TOPROL-XL) 25 MG 24 hr tablet, TAKE 1 TABLET DAILY, Disp: 30 tablet, Rfl: 3  •  multivitamin with minerals (CENTRUM SILVER PO), Take 1 tablet by mouth Daily., Disp: , Rfl:   •  nitroglycerin (NITROSTAT) 0.4 MG SL tablet, 1 under the tongue as needed for angina, may repeat q5mins for up three doses, Disp: 30 tablet, Rfl: 5  •  ranolazine (Ranexa) 500 MG 12 hr tablet, Take 1 tablet by mouth 2 (Two) Times a Day., Disp: 60 tablet, Rfl: 11  •  rosuvastatin (CRESTOR) 20 MG tablet, Take 1 tablet by mouth Daily., Disp: 90 tablet, Rfl: 3  •  theophylline (EDMOND-24) 300 MG 24 hr capsule, Take 300 mg by mouth Daily., Disp: , Rfl:   •  varenicline (CHANTIX) 1 MG tablet, Take 1 mg by mouth 2 (Two) Times a Day., Disp: , Rfl:    ?   ?   Patient has no known allergies.          ECG 12 Lead    Date/Time: 10/5/2022 3:14 PM  Performed by: Kei Cartagena APRN  Authorized by: Kei Cartagena APRN   Comparison: compared with previous ECG from 10/4/2021  Similar to previous ECG  Rhythm: sinus rhythm  Rate: normal  BPM: 64  QRS axis: normal  Comments:  ms  No acute changes             ?   Assessment & Plan    ?   ? 1. Chest pain    EKG reviewed by GLORY ROBLES, patient to take Ranexa 500 mg po bid that was sent in yesterday. Patient to also have nuclear stress test. Refill for nitro sent to pharmacy, explained by GLORY how to use, patient to go to ER if chest pain persists. Patient verbalized understanding. Angie PIZANO  ?

## 2022-10-25 ENCOUNTER — OUTSIDE FACILITY SERVICE (OUTPATIENT)
Dept: CARDIOLOGY | Facility: CLINIC | Age: 57
End: 2022-10-25

## 2022-10-25 PROCEDURE — 78452 HT MUSCLE IMAGE SPECT MULT: CPT | Performed by: INTERNAL MEDICINE

## 2022-10-25 PROCEDURE — 93018 CV STRESS TEST I&R ONLY: CPT | Performed by: INTERNAL MEDICINE

## 2022-11-15 DIAGNOSIS — I25.10 CORONARY ARTERY DISEASE INVOLVING NATIVE CORONARY ARTERY OF NATIVE HEART WITHOUT ANGINA PECTORIS: ICD-10-CM

## 2022-11-15 RX ORDER — ROSUVASTATIN CALCIUM 20 MG/1
TABLET, COATED ORAL
Qty: 90 TABLET | Refills: 5 | Status: SHIPPED | OUTPATIENT
Start: 2022-11-15

## 2022-11-15 RX ORDER — METOPROLOL SUCCINATE 25 MG/1
TABLET, EXTENDED RELEASE ORAL
Qty: 90 TABLET | Refills: 5 | Status: SHIPPED | OUTPATIENT
Start: 2022-11-15

## 2022-12-06 ENCOUNTER — APPOINTMENT (OUTPATIENT)
Dept: CARDIOLOGY | Facility: HOSPITAL | Age: 57
End: 2022-12-06

## 2022-12-13 ENCOUNTER — TELEPHONE (OUTPATIENT)
Dept: CARDIOLOGY | Facility: CLINIC | Age: 57
End: 2022-12-13

## 2022-12-15 ENCOUNTER — OFFICE VISIT (OUTPATIENT)
Dept: CARDIOLOGY | Facility: CLINIC | Age: 57
End: 2022-12-15

## 2022-12-15 VITALS
SYSTOLIC BLOOD PRESSURE: 105 MMHG | HEIGHT: 71 IN | DIASTOLIC BLOOD PRESSURE: 63 MMHG | HEART RATE: 62 BPM | OXYGEN SATURATION: 96 % | WEIGHT: 156.2 LBS | BODY MASS INDEX: 21.87 KG/M2

## 2022-12-15 DIAGNOSIS — I10 PRIMARY HYPERTENSION: ICD-10-CM

## 2022-12-15 DIAGNOSIS — R06.02 SHORTNESS OF BREATH: ICD-10-CM

## 2022-12-15 DIAGNOSIS — R07.89 ATYPICAL CHEST PAIN: ICD-10-CM

## 2022-12-15 DIAGNOSIS — I25.10 CORONARY ARTERY DISEASE INVOLVING NATIVE CORONARY ARTERY OF NATIVE HEART WITHOUT ANGINA PECTORIS: Primary | ICD-10-CM

## 2022-12-15 PROCEDURE — 99214 OFFICE O/P EST MOD 30 MIN: CPT | Performed by: NURSE PRACTITIONER

## 2022-12-15 NOTE — PROGRESS NOTES
"Subjective     Alysia Bernnan is a 57 y.o. female who presents today for Follow-up (6 month, [facility]), Chest Pain (Not active), Headache, and Fatigue.    CHIEF COMPLIANT  Chief Complaint   Patient presents with   • Follow-up     6 month, [facility]   • Chest Pain     Not active   • Headache   • Fatigue       Active Problems:  Problem List Items Addressed This Visit    None  Visit Diagnoses     Coronary artery disease involving native coronary artery of native heart without angina pectoris    -  Primary    Primary hypertension        Atypical chest pain        Shortness of breath              HPI  HPI     Alysia Brennan is a 57-year-old female who presents today for a follow-up for  chronic arterial hypertension and coronary artery disease.    The patient has a history of coronary artery disease in which she underwent previous stenting in 02/2022. She is on rosuvastatin for high intensity statin therapy,  Plavix and aspirin for dual antiplatelet therapy, and Ranexa and amlodipine for antianginal therapy. She was recently seen at [facility] on 10/24/2022 for chest pain and underwent an echocardiogram that showed a preserved ejection fraction as well as normal diastolic function. She also went under a Lexiscan on 10/25/2022 which also identified a normal LV function as well as Mild hypokinesis of the septum, small area of hypoperfusion involving the septum seen both during stress as well as at rest. So this was a fixed area.    Ms. Brennan reports that previous to visiting the emergency room, she was experiencing intermittent chest pains the night of 10/23/2022. She mentions that she \"belches and burps all the time\" but after getting up on 10/24/2022, still \"hurting\" in her chest, she decided it was time to go to the emergency room.  She described the chest discomfort as \"hurting off and on and thought something just was not right.\" She was admitted with a blood pressure of almost 200 mmHg systolic. She has " not had any chest pain or discomfort since leaving the hospital. The patient does have a history of chronic arterial hypertension which she is on amlodipine and metoprolol. Today, her blood pressure is 105/63 mmHg and her heart rate is 62 beats per minute. Her blood pressure at home is usually normal.     She still experiences dyspnea upon exertion due to her COPD. There is no change in her level of dyspnea. She is currently taking a smoking cessation medication of Chantix which is helping.    Her lab work during her hospital stay was reviewed with her today. CBC looked good for the most part. Her electrolytes all looked good, other than blood glucose. Chloride is a few points high. Her cholesterol is beautiful. Her A1c was 6.9 percent.    She just recently got over a short bout of influenza, which kept her in bed for 2 or 3 days. She had no energy and did not eat.     She denies any lower extremity edema, palpitations, syncope, PND, orthopnea, or strokelike symptoms.    PRIOR MEDS  Current Outpatient Medications on File Prior to Visit   Medication Sig Dispense Refill   • amLODIPine (NORVASC) 5 MG tablet Take 1 tablet by mouth Daily. 90 tablet 3   • arformoterol (BROVANA) 15 MCG/2ML nebulizer solution Take  by nebulization 2 (Two) Times a Day.     • Aspirin Low Dose 81 MG EC tablet TAKE 1 TABLET DAILY 90 tablet 3   • budesonide (PULMICORT) 0.25 MG/2ML nebulizer solution Take 0.25 mg by nebulization Daily.     • cetirizine (zyrTEC) 10 MG tablet Take 10 mg by mouth Daily.     • citalopram (CeleXA) 20 MG tablet Take 20 mg by mouth Daily.     • clopidogrel (PLAVIX) 75 MG tablet Take 1 tablet by mouth Daily. 30 tablet 8   • glimepiride (AMARYL) 4 MG tablet Take 4 mg by mouth Every Morning Before Breakfast.     • metFORMIN (GLUCOPHAGE) 1000 MG tablet Take 1,000 mg by mouth 2 (Two) Times a Day With Meals.     • metoprolol succinate XL (TOPROL-XL) 25 MG 24 hr tablet TAKE 1 TABLET DAILY 90 tablet 5   • multivitamin with  minerals tablet tablet Take 1 tablet by mouth Daily.     • nitroglycerin (NITROSTAT) 0.4 MG SL tablet 1 under the tongue as needed for angina, may repeat q5mins for up three doses 30 tablet 5   • ranolazine (Ranexa) 500 MG 12 hr tablet Take 1 tablet by mouth 2 (Two) Times a Day. 60 tablet 11   • rosuvastatin (CRESTOR) 20 MG tablet TAKE 1 TABLET DAILY 90 tablet 5   • theophylline (EDMOND-24) 300 MG 24 hr capsule Take 300 mg by mouth Daily.     • varenicline (CHANTIX) 1 MG tablet Take 1 mg by mouth 2 (Two) Times a Day.       No current facility-administered medications on file prior to visit.       ALLERGIES  Patient has no known allergies.    HISTORY  Past Medical History:   Diagnosis Date   • COPD (chronic obstructive pulmonary disease) (HCC)    • Diabetes mellitus (HCC)    • Hyperlipidemia        Social History     Socioeconomic History   • Marital status:    Tobacco Use   • Smoking status: Some Days     Years: 30.00     Types: Cigarettes   • Smokeless tobacco: Never   Vaping Use   • Vaping Use: Never used   Substance and Sexual Activity   • Alcohol use: Never   • Drug use: Never   • Sexual activity: Defer       Family History   Problem Relation Age of Onset   • No Known Problems Mother    • No Known Problems Father        Review of Systems   Constitutional: Positive for fatigue. Negative for chills and fever.   HENT: Positive for hearing loss. Negative for congestion, dental problem, ear discharge, ear pain, mouth sores, sinus pressure, sinus pain, sneezing and sore throat.    Eyes: Negative.  Negative for pain, discharge, redness, itching and visual disturbance.   Respiratory: Positive for shortness of breath (Pt has dx of COPD). Negative for cough, choking, chest tightness and wheezing.    Cardiovascular: Positive for chest pain. Negative for palpitations and leg swelling.   Gastrointestinal: Negative.  Negative for abdominal pain, blood in stool, constipation, diarrhea and nausea.   Endocrine: Negative.   "  Genitourinary: Negative.  Negative for hematuria.   Musculoskeletal: Negative.  Negative for back pain, joint swelling and neck pain.   Skin: Negative.    Allergic/Immunologic: Negative.    Neurological: Positive for headaches. Negative for dizziness, seizures, syncope, weakness and numbness.   Hematological: Negative.    Psychiatric/Behavioral: Negative.  Negative for sleep disturbance.       Objective     VITALS: /63 (BP Location: Left arm, Patient Position: Sitting, Cuff Size: Adult)   Pulse 62   Ht 180.3 cm (71\")   Wt 70.9 kg (156 lb 3.2 oz)   SpO2 96%   BMI 21.79 kg/m²     LABS:   Lab Results (most recent)     None      Lab work scanned in from [facility] dated 10/24/2022 was reviewed today. CBC looked good, even though some of the white blood cell types are off a little. Troponins were normal. Her electrolytes all looked good, other than blood glucose. Chloride is a few points high. Kidney function looks great. Calcium looks great. Thyroid looks good. Cholesterol is beautiful. LDL is 48 mg/dL. Her HDL was a little low at 38 mg/dL, with normal being 40 mg/dL. Triglycerides  were 100 mg/dL. Her A1c was 6.9 percent, which puts her average glucose around 151 mg/dL.    IMAGING:   No Images in the past 120 days found..    EXAM:  Physical Exam  Vitals and nursing note reviewed.   Constitutional:       Appearance: She is well-developed.   HENT:      Head: Normocephalic.   Eyes:      Pupils: Pupils are equal, round, and reactive to light.   Neck:      Thyroid: No thyroid mass.      Vascular: No carotid bruit or JVD.      Trachea: Trachea and phonation normal.   Cardiovascular:      Rate and Rhythm: Normal rate and regular rhythm.      Pulses:           Radial pulses are 2+ on the right side and 2+ on the left side.        Posterior tibial pulses are 2+ on the right side and 2+ on the left side.      Heart sounds: Normal heart sounds. No murmur heard.    No friction rub. No gallop. "   Pulmonary:      Effort: Pulmonary effort is normal. No respiratory distress.      Breath sounds: Normal breath sounds. No wheezing or rales.   Abdominal:      General: Bowel sounds are normal.      Palpations: Abdomen is soft.   Musculoskeletal:         General: No swelling. Normal range of motion.      Cervical back: Neck supple.   Skin:     General: Skin is warm and dry.      Capillary Refill: Capillary refill takes less than 2 seconds.      Findings: No rash.   Neurological:      Mental Status: She is alert and oriented to person, place, and time.   Psychiatric:         Speech: Speech normal.         Behavior: Behavior normal.         Thought Content: Thought content normal.         Judgment: Judgment normal.              Results    Echocardiogram performed at [facility] dated 10/24/2022 showed a preserved ejection fraction as well as normal diastolic function.     Lexiscan dated 10/25/2022 was reviewed today and identified a normal LV function as well as mild hypokinesis of the septum.  There is a small area of hypoperfusion involving the septum seen both during stress as well as at rest, so this was a fixed area      Procedure   Procedures       Assessment & Plan    Diagnosis Plan   1. Coronary artery disease involving native coronary artery of native heart without angina pectoris        2. Primary hypertension        3. Atypical chest pain        4. Shortness of breath            Return in about 6 months (around 6/15/2023), or if symptoms worsen or fail to improve.    Diagnoses and all orders for this visit:    1. Coronary artery disease involving native coronary artery of native heart without angina pectoris (Primary)    2. Primary hypertension    3. Atypical chest pain    4. Shortness of breath         Plan  1. The patient's hypertension is controlled on amlodipine and metoprolol. A medication change is not warranted at this time. She does not need any medication refills at this  time.  She will continue to monitor blood pressure on a routine basis report any significant highs or lows.  2. Patient had a recent hospital visit in 10/2022 due to chest pain, which an echocardiogram and Lexiscan showed now significant concerns. Her chest discomfort has since subsided. She will continue on current regimen of rosuvastatin for high intensity statin therapy, Plavix and aspirin for dual antiplatelet therapy, and Ranexa and amlodipine for antianginal therapy. She does have nitroglycerin at home as needed for chest pain.  3.  Patient's blood pressure is controlled on current blood pressure medication regimen.  No medication changes are warranted at this time.  Patient advised to monitor blood pressure on a daily basis and report any persistent highs or lows.  Set goal blood pressure for patient at 130/80 or below.  4.  Informed of signs and symptoms of ACS and advised to seek emergent treatment for any new worsening symptoms.  Patient also advised sooner follow-up as needed.  Also advised to follow-up with family doctor as needed  This note is dictated utilizing voice recognition software.  Although this record has been proof read, transcriptional errors may still be present. If questions occur regarding the content of this record please do not hesitate to call our office.  I have reviewed and confirmed the accuracy of the ROS as documented by the MA/LPN/RN TRAVIS Joseph    Assessment  1. Chronic arterial hypertension  2. Coronary artery disease    Alysia Brennan  reports that she has been smoking cigarettes. She has never used smokeless tobacco..           MEDS ORDERED DURING VISIT:  No orders of the defined types were placed in this encounter.          This document has been electronically signed by TRAVIS Joseph Jr.  December 15, 2022 23:33 EST     Transcribed from ambient dictation for TRAVIS Joseph by Shakila Stoner.  12/15/22   11:57 EST    Patient or patient representative  verbalized consent to the visit recording.  I have personally performed the services described in this document as transcribed by the above individual, and it is both accurate and complete.

## 2023-05-15 DIAGNOSIS — I20.9 ANGINA PECTORIS: ICD-10-CM

## 2023-05-15 DIAGNOSIS — R07.2 PRECORDIAL CHEST PAIN: ICD-10-CM

## 2023-05-15 DIAGNOSIS — I25.10 CORONARY ARTERY DISEASE INVOLVING NATIVE CORONARY ARTERY OF NATIVE HEART WITHOUT ANGINA PECTORIS: ICD-10-CM

## 2023-05-15 DIAGNOSIS — R00.2 PALPITATIONS: ICD-10-CM

## 2023-05-15 RX ORDER — RANOLAZINE 500 MG/1
TABLET, EXTENDED RELEASE ORAL
Qty: 180 TABLET | Refills: 11 | Status: SHIPPED | OUTPATIENT
Start: 2023-05-15

## 2023-05-15 RX ORDER — AMLODIPINE BESYLATE 5 MG/1
TABLET ORAL
Qty: 90 TABLET | Refills: 3 | Status: SHIPPED | OUTPATIENT
Start: 2023-05-15

## 2023-05-30 ENCOUNTER — TELEPHONE (OUTPATIENT)
Dept: CARDIOLOGY | Facility: CLINIC | Age: 58
End: 2023-05-30

## 2023-05-30 NOTE — TELEPHONE ENCOUNTER
Received cardiac clearance request from  stating pt has tooth extraction to be scheduled and is requiring a cardiac clearance. Placed cardiac clearance request in Amy's inbox to review and address with provider.

## 2023-06-15 ENCOUNTER — OFFICE VISIT (OUTPATIENT)
Dept: CARDIOLOGY | Facility: CLINIC | Age: 58
End: 2023-06-15
Payer: MEDICARE

## 2023-06-15 VITALS
SYSTOLIC BLOOD PRESSURE: 114 MMHG | WEIGHT: 159.8 LBS | OXYGEN SATURATION: 96 % | HEART RATE: 54 BPM | HEIGHT: 71 IN | DIASTOLIC BLOOD PRESSURE: 60 MMHG | BODY MASS INDEX: 22.37 KG/M2

## 2023-06-15 DIAGNOSIS — I10 PRIMARY HYPERTENSION: ICD-10-CM

## 2023-06-15 DIAGNOSIS — I20.9 ANGINA PECTORIS: ICD-10-CM

## 2023-06-15 DIAGNOSIS — R00.2 PALPITATIONS: ICD-10-CM

## 2023-06-15 DIAGNOSIS — R07.2 PRECORDIAL CHEST PAIN: ICD-10-CM

## 2023-06-15 DIAGNOSIS — I25.10 CORONARY ARTERY DISEASE INVOLVING NATIVE CORONARY ARTERY OF NATIVE HEART WITHOUT ANGINA PECTORIS: Primary | ICD-10-CM

## 2023-06-15 DIAGNOSIS — R06.02 SHORTNESS OF BREATH: ICD-10-CM

## 2023-06-15 RX ORDER — RANOLAZINE 500 MG/1
500 TABLET, EXTENDED RELEASE ORAL 2 TIMES DAILY
Qty: 180 TABLET | Refills: 3 | Status: SHIPPED | OUTPATIENT
Start: 2023-06-15

## 2023-06-15 RX ORDER — ROSUVASTATIN CALCIUM 20 MG/1
20 TABLET, COATED ORAL DAILY
Qty: 90 TABLET | Refills: 3 | Status: SHIPPED | OUTPATIENT
Start: 2023-06-15

## 2023-06-15 RX ORDER — METOPROLOL SUCCINATE 25 MG/1
25 TABLET, EXTENDED RELEASE ORAL DAILY
Qty: 90 TABLET | Refills: 3 | Status: SHIPPED | OUTPATIENT
Start: 2023-06-15

## 2023-06-15 NOTE — PROGRESS NOTES
Subjective     Alysia Brennan is a 57 y.o. female who presents to [facility] for Coronary Artery Disease (6 month follow up ) and Shortness of Breath.    CHIEF COMPLIANT  Chief Complaint   Patient presents with   • Coronary Artery Disease     6 month follow up    • Shortness of Breath       Active Problems:  Problem List Items Addressed This Visit    None  Visit Diagnoses     Coronary artery disease involving native coronary artery of native heart without angina pectoris    -  Primary    Relevant Medications    ranolazine (RANEXA) 500 MG 12 hr tablet    rosuvastatin (CRESTOR) 20 MG tablet    metoprolol succinate XL (TOPROL-XL) 25 MG 24 hr tablet    Shortness of breath        Primary hypertension        Relevant Medications    metoprolol succinate XL (TOPROL-XL) 25 MG 24 hr tablet    Palpitations        Relevant Medications    ranolazine (RANEXA) 500 MG 12 hr tablet    Precordial chest pain        Relevant Medications    ranolazine (RANEXA) 500 MG 12 hr tablet    Angina pectoris        Relevant Medications    ranolazine (RANEXA) 500 MG 12 hr tablet    metoprolol succinate XL (TOPROL-XL) 25 MG 24 hr tablet      Problem list  1.  Chest pain  1.1 left heart catheterization 2/22: Left main normal, LAD normal, circumflex normal, 70% long diffuse calcified disease in the RCA with stent placement of 3.5 x 38 mm drug-eluting stent  2.  Shortness of breath, sees Dr Caldera  2.1 echocardiogram 1/22: EF 50 to 55% questionable mild septal hypokinesis, grade 1 diastolic dysfunction, no significant valvular disease    HPI  HPI    Alysia Brennan is a 57-year-old female patient being followed up today for coronary artery disease and chronic arterial hypertension.     Patient does have chronic arterial hypertension, which she is on amlodipine and metoprolol. She is also on Plavix and aspirin for dual antiplatelet therapy.  Today, her blood pressure is controlled at 114/60 mmHg, and a heart rate of 54 beats per minute. The patient is taking  metoprolol 25 mg as prescribed. She has not noticed dizziness or lightheadedness.    The patient has a history of COPD. She reports she often has a wheeze and experiences dyspnea during activities such as stair climbing or carrying objects. She experiences shortness of breath while at rest. She denies smothering when she lays flat.    She does not experience chest pain, chest pressure,  or chest heaviness.She is on Ranexa for antianginal therapy, rosuvastatin for high intensity statin therapy for her coronary artery disease. She reports the last episode of chest pain was a while ago and she was hospitalized.    The patient reports she continues to experience swelling around her right ankle that worsens by bedtime. She states it is painful and sore. She reports both ankles are affected, but is worse on the right. She states her symptoms diminish by the morning and she adds that the current episode is better than previous episodes.  She was seen by Dr. Navarrete who believes her symptoms are those of venous stasis. He did not complete an ultrasound or blood work.    She reports that she bruises easily.    PRIOR MEDS  Current Outpatient Medications on File Prior to Visit   Medication Sig Dispense Refill   • amLODIPine (NORVASC) 5 MG tablet TAKE 1 TABLET DAILY 90 tablet 3   • arformoterol (BROVANA) 15 MCG/2ML nebulizer solution Take  by nebulization 2 (Two) Times a Day.     • budesonide (PULMICORT) 0.25 MG/2ML nebulizer solution Take 2 mL by nebulization Daily.     • cetirizine (zyrTEC) 10 MG tablet Take 1 tablet by mouth Daily.     • citalopram (CeleXA) 20 MG tablet Take 1 tablet by mouth Daily.     • clopidogrel (PLAVIX) 75 MG tablet Take 1 tablet by mouth Daily. 30 tablet 8   • glimepiride (AMARYL) 4 MG tablet Take 1 tablet by mouth Every Morning Before Breakfast.     • metFORMIN (GLUCOPHAGE) 1000 MG tablet Take 1 tablet by mouth 2 (Two) Times a Day With Meals.     • multivitamin with minerals tablet tablet Take 1  tablet by mouth Daily.     • nitroglycerin (NITROSTAT) 0.4 MG SL tablet 1 under the tongue as needed for angina, may repeat q5mins for up three doses 30 tablet 5   • theophylline (EDMOND-24) 300 MG 24 hr capsule Take 1 capsule by mouth Daily.     • varenicline (CHANTIX) 1 MG tablet Take 1 tablet by mouth 2 (Two) Times a Day.       No current facility-administered medications on file prior to visit.       ALLERGIES  Patient has no known allergies.    HISTORY  Past Medical History:   Diagnosis Date   • COPD (chronic obstructive pulmonary disease)    • Diabetes mellitus    • Hyperlipidemia        Social History     Socioeconomic History   • Marital status:    Tobacco Use   • Smoking status: Every Day     Years: 30.00     Types: Cigarettes   • Smokeless tobacco: Never   Vaping Use   • Vaping Use: Never used   Substance and Sexual Activity   • Alcohol use: Never   • Drug use: Never   • Sexual activity: Defer       Family History   Problem Relation Age of Onset   • No Known Problems Mother    • No Known Problems Father        Review of Systems   Constitutional: Negative.  Negative for chills, diaphoresis, fatigue and fever.   Eyes: Negative.  Negative for visual disturbance.   Respiratory:  Positive for shortness of breath (COPD) and wheezing. Negative for apnea, cough and chest tightness.    Cardiovascular:  Positive for leg swelling (R foot swelling). Negative for chest pain and palpitations.   Gastrointestinal: Negative.  Negative for blood in stool.   Endocrine: Negative.    Genitourinary:  Positive for frequency. Negative for hematuria.   Musculoskeletal: Negative.  Negative for arthralgias, back pain, myalgias, neck pain and neck stiffness.   Skin: Negative.  Negative for color change.   Allergic/Immunologic: Negative.  Negative for environmental allergies and food allergies.   Neurological:  Positive for numbness (jesse. feet) and headaches (occas.). Negative for dizziness, syncope, weakness and  "light-headedness.   Hematological:  Bruises/bleeds easily.   Psychiatric/Behavioral:  Positive for sleep disturbance (to bathroom).      Objective     VITALS: /60 (BP Location: Left arm, Patient Position: Sitting)   Pulse 54   Ht 180.3 cm (70.98\")   Wt 72.5 kg (159 lb 12.8 oz)   SpO2 96% Comment: room air  BMI 22.30 kg/m²     LABS:   Lab Results (most recent)       None            IMAGING:   No Images in the past 120 days found..    EXAM:  Physical Exam  Vitals and nursing note reviewed.   Constitutional:       Appearance: She is well-developed.   HENT:      Head: Normocephalic.   Neck:      Thyroid: No thyroid mass.      Vascular: No carotid bruit or JVD.      Trachea: Trachea and phonation normal.   Cardiovascular:      Rate and Rhythm: Normal rate and regular rhythm.      Pulses:           Radial pulses are 2+ on the right side and 2+ on the left side.        Posterior tibial pulses are 2+ on the right side and 2+ on the left side.      Heart sounds: Normal heart sounds. No murmur heard.    No friction rub. No gallop.   Pulmonary:      Effort: Pulmonary effort is normal. No respiratory distress.      Breath sounds: Normal breath sounds. No wheezing or rales.   Musculoskeletal:         General: No swelling. Normal range of motion.      Cervical back: Neck supple.   Skin:     General: Skin is warm and dry.      Capillary Refill: Capillary refill takes less than 2 seconds.      Findings: No rash.   Neurological:      Mental Status: She is alert and oriented to person, place, and time.   Psychiatric:         Speech: Speech normal.         Behavior: Behavior normal.         Thought Content: Thought content normal.         Judgment: Judgment normal.       Procedure   Procedures       Assessment & Plan    Diagnosis Plan   1. Coronary artery disease involving native coronary artery of native heart without angina pectoris  rosuvastatin (CRESTOR) 20 MG tablet    metoprolol succinate XL (TOPROL-XL) 25 MG 24 hr " tablet      2. Shortness of breath        3. Primary hypertension        4. Palpitations  ranolazine (RANEXA) 500 MG 12 hr tablet      5. Precordial chest pain  ranolazine (RANEXA) 500 MG 12 hr tablet      6. Angina pectoris  ranolazine (RANEXA) 500 MG 12 hr tablet        PLAN  Cardiac health maintenance  1. The patient's most recent cardiac testing results were discussed in full detail during this visit.  2. The patient's medication regimen was reviewed and discussed with him in full detail.  3. The patient will decrease her metoprolol from 25 mg to 12.5. She will half her tablets.  She will continue to monitor her heart rate as well as her blood pressure and report any significant highs or lows.  4.  Patient does have coronary artery disease which seems to be relatively well controlled.  Majority of her shortness of breath is most likely due to her COPD.  She denies any chest pain.  She does request to discontinue either the aspirin or Plavix due to extensive ease of bruising.  She will continue the Plavix 75 mg daily and discontinue the aspirin 81 mg daily.  5.  Informed of signs and symptoms of ACS and advised to seek emergent treatment for any new worsening symptoms.  Patient also advised sooner follow-up as needed.  Also advised to follow-up with family doctor as needed  This note is dictated utilizing voice recognition software.  Although this record has been proof read, transcriptional errors may still be present. If questions occur regarding the content of this record please do not hesitate to call our office.  I have reviewed and confirmed the accuracy of the ROS as documented by the MA/LPN/RN TRAVIS Joseph    Assessment  1. Chronic arterial hypertension  2. Coronary artery disease  3. Chest pain  4. Hyperlipidemia  5. COPD  6. Pedal edema  Return in about 6 months (around 12/15/2023), or if symptoms worsen or fail to improve.    Diagnoses and all orders for this visit:    1. Coronary artery disease  involving native coronary artery of native heart without angina pectoris (Primary)  -     rosuvastatin (CRESTOR) 20 MG tablet; Take 1 tablet by mouth Daily.  Dispense: 90 tablet; Refill: 3  -     metoprolol succinate XL (TOPROL-XL) 25 MG 24 hr tablet; Take 1 tablet by mouth Daily.  Dispense: 90 tablet; Refill: 3    2. Shortness of breath    3. Primary hypertension    4. Palpitations  -     ranolazine (RANEXA) 500 MG 12 hr tablet; Take 1 tablet by mouth 2 (Two) Times a Day.  Dispense: 180 tablet; Refill: 3    5. Precordial chest pain  -     ranolazine (RANEXA) 500 MG 12 hr tablet; Take 1 tablet by mouth 2 (Two) Times a Day.  Dispense: 180 tablet; Refill: 3    6. Angina pectoris  -     ranolazine (RANEXA) 500 MG 12 hr tablet; Take 1 tablet by mouth 2 (Two) Times a Day.  Dispense: 180 tablet; Refill: 3      PROCEDURE  No procedures were completed today.        Alysia Brennan  reports that she has been smoking cigarettes. She has never used smokeless tobacco.. I have educated her on the risk of diseases from using tobacco products such as cancer, COPD, and heart disease.     I advised her to quit and she is not willing to quit.    I spent 3  minutes counseling the patient.           BMI is within normal parameters. No other follow-up for BMI required.           MEDS ORDERED DURING VISIT:  New Medications Ordered This Visit   Medications   • ranolazine (RANEXA) 500 MG 12 hr tablet     Sig: Take 1 tablet by mouth 2 (Two) Times a Day.     Dispense:  180 tablet     Refill:  3   • rosuvastatin (CRESTOR) 20 MG tablet     Sig: Take 1 tablet by mouth Daily.     Dispense:  90 tablet     Refill:  3     PT WANTS 90 DAYS   • metoprolol succinate XL (TOPROL-XL) 25 MG 24 hr tablet     Sig: Take 1 tablet by mouth Daily.     Dispense:  90 tablet     Refill:  3     PT WANTS 90 DAYS           This document has been electronically signed by Kei Cartagena Jr., APRN  June 18, 2023 23:12 EDT  Transcribed from ambient dictation for Kei  TRAVIS Cartagena by Cristina Vigil.  06/15/23   10:59 EDT    Patient or patient representative verbalized consent to the visit recording.  I have personally performed the services described in this document as transcribed by the above individual, and it is both accurate and complete.

## 2023-08-09 ENCOUNTER — TELEPHONE (OUTPATIENT)
Dept: CARDIOLOGY | Facility: CLINIC | Age: 58
End: 2023-08-09
Payer: MEDICARE

## 2023-08-09 NOTE — TELEPHONE ENCOUNTER
Received cardiac clearance request from  stating pt has colonoscopy scheduled for 08/22/2023 and is requiring a cardiac clearance. Placed cardiac clearance request in Brenda's inbox to review and address with provider.

## 2023-08-12 DIAGNOSIS — I25.10 CORONARY ARTERY DISEASE INVOLVING NATIVE CORONARY ARTERY OF NATIVE HEART WITHOUT ANGINA PECTORIS: ICD-10-CM

## 2023-08-14 RX ORDER — ASPIRIN 81 MG/1
TABLET, COATED ORAL
Qty: 90 TABLET | Refills: 3 | Status: SHIPPED | OUTPATIENT
Start: 2023-08-14

## 2023-08-14 RX ORDER — CLOPIDOGREL BISULFATE 75 MG/1
TABLET ORAL
Qty: 90 TABLET | Refills: 5 | Status: SHIPPED | OUTPATIENT
Start: 2023-08-14

## 2023-09-06 ENCOUNTER — TELEPHONE (OUTPATIENT)
Dept: CARDIOLOGY | Facility: CLINIC | Age: 58
End: 2023-09-06
Payer: MEDICARE

## 2023-09-06 NOTE — TELEPHONE ENCOUNTER
Received cardiac clearance request from  stating pt has lap right nataliia colectomy scheduled for 09/14/2023 and is requiring a cardiac clearance. Placed cardiac clearance request in Brenda's inbox to review and address with provider.

## 2023-09-07 NOTE — TELEPHONE ENCOUNTER
Surgeon's office called to check on status of CC, pt needs to start holding meds on 09/08/2023, procedure is 09/14/2023. Thank you.

## 2023-12-05 ENCOUNTER — TELEPHONE (OUTPATIENT)
Dept: CARDIOLOGY | Facility: CLINIC | Age: 58
End: 2023-12-05
Payer: MEDICARE

## 2023-12-05 NOTE — TELEPHONE ENCOUNTER
Called pt to r/s appt, provider will be out of office. No answer, it pt calls back please put through to tracey

## 2023-12-08 ENCOUNTER — OFFICE VISIT (OUTPATIENT)
Dept: CARDIOLOGY | Facility: CLINIC | Age: 58
End: 2023-12-08
Payer: MEDICARE

## 2023-12-08 VITALS
HEART RATE: 65 BPM | OXYGEN SATURATION: 97 % | SYSTOLIC BLOOD PRESSURE: 104 MMHG | BODY MASS INDEX: 21.22 KG/M2 | WEIGHT: 151.6 LBS | HEIGHT: 71 IN | DIASTOLIC BLOOD PRESSURE: 58 MMHG

## 2023-12-08 DIAGNOSIS — I10 PRIMARY HYPERTENSION: ICD-10-CM

## 2023-12-08 DIAGNOSIS — I25.10 CORONARY ARTERY DISEASE INVOLVING NATIVE CORONARY ARTERY OF NATIVE HEART WITHOUT ANGINA PECTORIS: Primary | ICD-10-CM

## 2023-12-08 DIAGNOSIS — R06.02 SHORTNESS OF BREATH: ICD-10-CM

## 2023-12-08 PROCEDURE — 99213 OFFICE O/P EST LOW 20 MIN: CPT | Performed by: NURSE PRACTITIONER

## 2023-12-08 PROCEDURE — 93000 ELECTROCARDIOGRAM COMPLETE: CPT | Performed by: NURSE PRACTITIONER

## 2023-12-08 PROCEDURE — 1160F RVW MEDS BY RX/DR IN RCRD: CPT | Performed by: NURSE PRACTITIONER

## 2023-12-08 PROCEDURE — 1159F MED LIST DOCD IN RCRD: CPT | Performed by: NURSE PRACTITIONER

## 2023-12-08 NOTE — PROGRESS NOTES
Subjective     Alysia Brennan is a 58 y.o. female who presents to day for Follow-up and Coronary Artery Disease.    CHIEF COMPLIANT  Chief Complaint   Patient presents with    Follow-up    Coronary Artery Disease       Active Problems:  Problem List Items Addressed This Visit    None  Visit Diagnoses       Coronary artery disease involving native coronary artery of native heart without angina pectoris    -  Primary    Shortness of breath        Primary hypertension            Problem list  1.  Chest pain  1.1 left heart catheterization 2/22: Left main normal, LAD normal, circumflex normal, 70% long diffuse calcified disease in the RCA with stent placement of 3.5 x 38 mm drug-eluting stent  2.  Shortness of breath, sees Dr Caldera  2.1 echocardiogram 1/22: EF 50 to 55% questionable mild septal hypokinesis, grade 1 diastolic dysfunction, no significant valvular disease    HPI  HPI  Alysia Brennan is a 58-year-old female patient who is being seen today for coronary artery disease.  Did go under left heart catheterization in February 2022 that showed a normal left main, LAD, and circumflex.  She had a 70% long diffuse calcified disease in the RCA with stent placement of a 3.5 x 38 mm drug-eluting stent at that time.  She was on dual antiplatelet therapy for a year and is currently only on single antiplatelet therapy of Plavix.  She is on antianginal therapy of Ranexa and amlodipine.  She is on high intensity statin therapy of rosuvastatin.    Patient does have chronic arterial hypertension which she is on metoprolol and amlodipine.  Today her blood pressure is 104/58 heart rate is 65.  Reports that her blood pressure is usually really good at home as well.    Patient does have chronic shortness of breath that is worse with activity.  She does use oxygen at night and as needed during the day.  She has a history of COPD. She says her shortness of breath has gotten little bit worse which she contributes some to allergies.  She  says that she gets short of breath that she is active such as walking and carrying symptoms.  She is also reports that her sinuses has been causing her a lot of issues.  She denies any dyspnea at rest or orthopnea.  She does continue to use oxygen at night and as needed.    Overall she feels like she is doing well from the cardiovascular standpoint.  She denies any angina anginal equivalent symptoms.  Patient denies any chest pain,  palpitations, lower extremity edema, fatigue, syncope, PND, orthopnea, or strokelike symptoms.  PRIOR MEDS  Current Outpatient Medications on File Prior to Visit   Medication Sig Dispense Refill    amLODIPine (NORVASC) 5 MG tablet TAKE 1 TABLET DAILY 90 tablet 3    arformoterol (BROVANA) 15 MCG/2ML nebulizer solution Take  by nebulization 2 (Two) Times a Day.      budesonide (PULMICORT) 0.25 MG/2ML nebulizer solution Take 2 mL by nebulization Daily.      cetirizine (zyrTEC) 10 MG tablet Take 1 tablet by mouth Daily.      citalopram (CeleXA) 20 MG tablet Take 1 tablet by mouth Daily.      clopidogrel (PLAVIX) 75 MG tablet TAKE 1 TABLET DAILY 90 tablet 5    glimepiride (AMARYL) 4 MG tablet Take 1 tablet by mouth 2 (Two) Times a Day.      metFORMIN (GLUCOPHAGE) 1000 MG tablet Take 1 tablet by mouth 2 (Two) Times a Day With Meals.      metoprolol succinate XL (TOPROL-XL) 25 MG 24 hr tablet Take 1 tablet by mouth Daily. (Patient taking differently: Take 0.5 tablets by mouth Daily.) 90 tablet 3    multivitamin with minerals tablet tablet Take 1 tablet by mouth Daily.      nitroglycerin (NITROSTAT) 0.4 MG SL tablet 1 under the tongue as needed for angina, may repeat q5mins for up three doses 30 tablet 5    O2 (OXYGEN) Inhale 2 L/min As Needed.      ranolazine (RANEXA) 500 MG 12 hr tablet Take 1 tablet by mouth 2 (Two) Times a Day. 180 tablet 3    rosuvastatin (CRESTOR) 20 MG tablet Take 1 tablet by mouth Daily. 90 tablet 3    theophylline (EDMOND-24) 300 MG 24 hr capsule Take 1 capsule by mouth  Daily.      varenicline (CHANTIX) 1 MG tablet Take 1 tablet by mouth 2 (Two) Times a Day.      [DISCONTINUED] Aspirin Low Dose 81 MG EC tablet TAKE 1 TABLET DAILY 90 tablet 3     No current facility-administered medications on file prior to visit.       ALLERGIES  Patient has no known allergies.    HISTORY  Past Medical History:   Diagnosis Date    COPD (chronic obstructive pulmonary disease)     Diabetes mellitus     Hyperlipidemia        Social History     Socioeconomic History    Marital status:    Tobacco Use    Smoking status: Every Day     Years: 30     Types: Cigarettes    Smokeless tobacco: Never   Vaping Use    Vaping Use: Never used   Substance and Sexual Activity    Alcohol use: Never    Drug use: Never    Sexual activity: Defer       Family History   Problem Relation Age of Onset    No Known Problems Mother     No Known Problems Father        Review of Systems   Constitutional:  Positive for fatigue. Negative for chills, diaphoresis and fever.   HENT:  Positive for hearing loss.    Eyes: Negative.  Negative for visual disturbance.   Respiratory:  Positive for apnea (2L O2 at night or as needed), cough, shortness of breath (has COPD) and wheezing. Negative for chest tightness.    Cardiovascular: Negative.  Negative for chest pain, palpitations and leg swelling.   Gastrointestinal: Negative.  Negative for blood in stool.        Polyp removed from colon   Endocrine: Negative.  Negative for cold intolerance and heat intolerance.   Genitourinary: Negative.  Negative for hematuria.   Musculoskeletal: Negative.  Negative for arthralgias, back pain, myalgias, neck pain and neck stiffness.   Skin: Negative.  Negative for color change, rash and wound.   Allergic/Immunologic: Negative.  Negative for environmental allergies and food allergies.   Neurological:  Positive for headaches (occas maybe due to sinuses). Negative for dizziness, syncope, weakness, light-headedness and numbness.   Hematological:   "Bruises/bleeds easily.   Psychiatric/Behavioral: Negative.  Negative for sleep disturbance.        Objective     VITALS: /58 (BP Location: Left arm, Patient Position: Sitting)   Pulse 65   Ht 180.3 cm (71\")   Wt 68.8 kg (151 lb 9.6 oz)   SpO2 97%   BMI 21.14 kg/m²     LABS:   Lab Results (most recent)       None            IMAGING:   No Images in the past 120 days found..    EXAM:  Physical Exam  Vitals and nursing note reviewed.   Constitutional:       Appearance: She is well-developed.   HENT:      Head: Normocephalic.   Neck:      Thyroid: No thyroid mass.      Vascular: No carotid bruit or JVD.      Trachea: Trachea and phonation normal.   Cardiovascular:      Rate and Rhythm: Normal rate and regular rhythm.      Pulses:           Radial pulses are 2+ on the right side and 2+ on the left side.        Posterior tibial pulses are 2+ on the right side and 2+ on the left side.      Heart sounds: Normal heart sounds. No murmur heard.     No friction rub. No gallop.   Pulmonary:      Effort: Pulmonary effort is normal. No respiratory distress.      Breath sounds: Normal breath sounds. No wheezing or rales.   Musculoskeletal:         General: No swelling. Normal range of motion.      Cervical back: Neck supple.   Skin:     General: Skin is warm and dry.      Capillary Refill: Capillary refill takes less than 2 seconds.      Findings: No rash.   Neurological:      Mental Status: She is alert and oriented to person, place, and time.   Psychiatric:         Speech: Speech normal.         Behavior: Behavior normal.         Thought Content: Thought content normal.         Judgment: Judgment normal.         Procedure     ECG 12 Lead    Date/Time: 12/8/2023 8:25 AM  Performed by: Kei Cartagena APRN    Authorized by: Kei Cartagena APRN  Comparison: compared with previous ECG from 10/24/2022  Comparison to previous ECG: New first-degree AV block  Rhythm: sinus bradycardia  Rate: bradycardic  BPM: 56  Conduction: " 1st degree AV block  QRS axis: normal  Comments: QTc 397 ms  No acute changes             Assessment & Plan    Diagnosis Plan   1. Coronary artery disease involving native coronary artery of native heart without angina pectoris        2. Shortness of breath        3. Primary hypertension        1.  Patient does have a history of coronary artery disease with previous stenting.  She denies any angina anginal equivalent symptoms.  We will continue to monitor.  Her shortness of breath is stable and she contributes to her COPD.  Is also follow-up with Dr. Caldera.  2.  Patient's blood pressure is controlled on current blood pressure medication regimen.  No medication changes are warranted at this time.  Patient advised to monitor blood pressure on a daily basis and report any persistent highs or lows.  Set goal blood pressure for patient at 130/80 or below.  3.  Informed of signs and symptoms of ACS and advised to seek emergent treatment for any new worsening symptoms.  Patient also advised sooner follow-up as needed.  Also advised to follow-up with family doctor as needed  This note is dictated utilizing voice recognition software.  Although this record has been proof read, transcriptional errors may still be present. If questions occur regarding the content of this record please do not hesitate to call our office.  I have reviewed and confirmed the accuracy of the ROS as documented by the MA/LPN/RN TRAVIS Joseph    Return if symptoms worsen or fail to improve, for Next scheduled follow up.    Diagnoses and all orders for this visit:    1. Coronary artery disease involving native coronary artery of native heart without angina pectoris (Primary)    2. Shortness of breath    3. Primary hypertension    Other orders  -     ECG 12 Lead        Alysia Brennan  reports that she has been smoking cigarettes. She has never used smokeless tobacco.. I have educated her on the risk of diseases from using tobacco products such as  cancer, COPD, and heart disease.     I advised her to quit and she is willing to quit. We have discussed the following method/s for tobacco cessation:  Education Material.     I spent 3  minutes counseling the patient.    Patient brought in medicine list to appointment, it's been reviewed with patient and med list was updated in the chart.            MEDS ORDERED DURING VISIT:  No orders of the defined types were placed in this encounter.          This document has been electronically signed by Kei Cartagena Jr., APRN  December 8, 2023 08:39 EST

## 2024-06-07 DIAGNOSIS — R07.2 PRECORDIAL CHEST PAIN: ICD-10-CM

## 2024-06-07 DIAGNOSIS — I20.9 ANGINA PECTORIS: ICD-10-CM

## 2024-06-07 DIAGNOSIS — R00.2 PALPITATIONS: ICD-10-CM

## 2024-06-07 RX ORDER — RANOLAZINE 500 MG/1
TABLET, EXTENDED RELEASE ORAL
Qty: 180 TABLET | Refills: 3 | Status: SHIPPED | OUTPATIENT
Start: 2024-06-07

## 2024-06-11 ENCOUNTER — OFFICE VISIT (OUTPATIENT)
Dept: CARDIOLOGY | Facility: CLINIC | Age: 59
End: 2024-06-11
Payer: MEDICARE

## 2024-06-11 VITALS
SYSTOLIC BLOOD PRESSURE: 116 MMHG | BODY MASS INDEX: 22.23 KG/M2 | DIASTOLIC BLOOD PRESSURE: 63 MMHG | HEIGHT: 71 IN | WEIGHT: 158.8 LBS | OXYGEN SATURATION: 97 % | HEART RATE: 56 BPM

## 2024-06-11 DIAGNOSIS — R00.2 PALPITATIONS: ICD-10-CM

## 2024-06-11 DIAGNOSIS — I20.9 ANGINA PECTORIS: ICD-10-CM

## 2024-06-11 DIAGNOSIS — I25.10 CORONARY ARTERY DISEASE INVOLVING NATIVE CORONARY ARTERY OF NATIVE HEART WITHOUT ANGINA PECTORIS: ICD-10-CM

## 2024-06-11 DIAGNOSIS — I10 PRIMARY HYPERTENSION: ICD-10-CM

## 2024-06-11 DIAGNOSIS — I25.10 CORONARY ARTERY DISEASE INVOLVING NATIVE CORONARY ARTERY OF NATIVE HEART WITHOUT ANGINA PECTORIS: Primary | ICD-10-CM

## 2024-06-11 DIAGNOSIS — R07.2 PRECORDIAL CHEST PAIN: ICD-10-CM

## 2024-06-11 DIAGNOSIS — R06.02 SHORTNESS OF BREATH: ICD-10-CM

## 2024-06-11 PROCEDURE — 99213 OFFICE O/P EST LOW 20 MIN: CPT | Performed by: NURSE PRACTITIONER

## 2024-06-11 PROCEDURE — 1160F RVW MEDS BY RX/DR IN RCRD: CPT | Performed by: NURSE PRACTITIONER

## 2024-06-11 PROCEDURE — 1159F MED LIST DOCD IN RCRD: CPT | Performed by: NURSE PRACTITIONER

## 2024-06-11 RX ORDER — METOPROLOL SUCCINATE 25 MG/1
12.5 TABLET, EXTENDED RELEASE ORAL DAILY
Qty: 45 TABLET | Refills: 3 | Status: SHIPPED | OUTPATIENT
Start: 2024-06-11

## 2024-06-11 RX ORDER — ROSUVASTATIN CALCIUM 20 MG/1
20 TABLET, COATED ORAL DAILY
Qty: 90 TABLET | Refills: 3 | Status: SHIPPED | OUTPATIENT
Start: 2024-06-11

## 2024-06-11 RX ORDER — AMLODIPINE BESYLATE 5 MG/1
5 TABLET ORAL DAILY
Qty: 90 TABLET | Refills: 5 | Status: SHIPPED | OUTPATIENT
Start: 2024-06-11

## 2024-06-11 RX ORDER — ALBUTEROL SULFATE 90 UG/1
AEROSOL, METERED RESPIRATORY (INHALATION)
COMMUNITY

## 2024-06-11 RX ORDER — RANOLAZINE 500 MG/1
500 TABLET, EXTENDED RELEASE ORAL 2 TIMES DAILY
Qty: 180 TABLET | Refills: 3 | Status: SHIPPED | OUTPATIENT
Start: 2024-06-11

## 2024-06-11 RX ORDER — AMLODIPINE BESYLATE 5 MG/1
5 TABLET ORAL DAILY
Qty: 90 TABLET | Refills: 3 | Status: SHIPPED | OUTPATIENT
Start: 2024-06-11 | End: 2024-06-11

## 2024-06-11 RX ORDER — NITROGLYCERIN 0.4 MG/1
TABLET SUBLINGUAL
Qty: 30 TABLET | Refills: 5 | Status: SHIPPED | OUTPATIENT
Start: 2024-06-11

## 2024-06-11 RX ORDER — CLOPIDOGREL BISULFATE 75 MG/1
75 TABLET ORAL DAILY
Qty: 90 TABLET | Refills: 3 | Status: SHIPPED | OUTPATIENT
Start: 2024-06-11

## 2024-06-11 NOTE — PROGRESS NOTES
Subjective     Alysia Brennan is a 58 y.o. female who presents to day for Follow-up (Continual care for cardiac management - 6 months since last visit //Labs-2024 by PCP - records requested /Medication- Patient brought in medicine list to appointment, it's been reviewed with patient and med list was updated in the chart.  ) and Coronary Artery Disease.    CHIEF COMPLIANT  Chief Complaint   Patient presents with    Follow-up     Continual care for cardiac management - 6 months since last visit     Labs-2024 by PCP - records requested   Medication- Patient brought in medicine list to appointment, it's been reviewed with patient and med list was updated in the chart.      Coronary Artery Disease       Active Problems:  1.  Chest pain  1.1 left heart catheterization 2/22: Left main normal, LAD normal, circumflex normal, 70% long diffuse calcified disease in the RCA with stent placement of 3.5 x 38 mm drug-eluting stent  2.  Shortness of breath, sees Dr Caldera  2.1 echocardiogram 1/22: EF 50 to 55% questionable mild septal hypokinesis, grade 1 diastolic dysfunction, no significant valvular disease  3.  Hypertension    HPI  HPI  Ms. Alysia Brennan is a 58-year-old female patient who is being seen today for history of coronary artery disease.    She does have a history of coronary artery disease in which she did receive a stent to the right coronary artery in February 2022.  She is on antiplatelet therapy of Plavix and high intensity statin therapy of rosuvastatin.  She is also on Ranexa for antianginal therapy.    Patient does have chronic arterial hypertension and which she is being treated with metoprolol and amlodipine.  Today her blood pressure is well-controlled at 116/63 heart rate of 56.  She denies any associated symptoms with her chronic arterial hypertension.    Patient does report some chest tightness that occurs in her epigastric to lower rib area across both sides.  It occurs 1-2 times a week and last about 1 to  2 hours per episode.  It is intermittent and she describes it as a tightness type sensation that occurs both with activity and at rest.  She denies any associated symptoms or obvious triggers.  She says it is not like the pain she had with her previous stenting.    Patient also has shortness of breath which she does have a history of COPD.  This is unchanged nonprogressive.  Usually only occurs with activity such as walking a distance.  She denies any dyspnea at rest or any orthopnea.  PRIOR MEDS  Current Outpatient Medications on File Prior to Visit   Medication Sig Dispense Refill    arformoterol (BROVANA) 15 MCG/2ML nebulizer solution Take  by nebulization 2 (Two) Times a Day.      budesonide (PULMICORT) 0.25 MG/2ML nebulizer solution Take 2 mL by nebulization Daily.      cetirizine (zyrTEC) 10 MG tablet Take 1 tablet by mouth Daily.      citalopram (CeleXA) 20 MG tablet Take 1 tablet by mouth Daily.      glimepiride (AMARYL) 4 MG tablet Take 1 tablet by mouth 2 (Two) Times a Day.      metFORMIN (GLUCOPHAGE) 1000 MG tablet Take 1 tablet by mouth 2 (Two) Times a Day With Meals.      O2 (OXYGEN) Inhale 2 L/min As Needed.      theophylline (EDMOND-24) 300 MG 24 hr capsule Take 1 capsule by mouth Daily.      varenicline (CHANTIX) 1 MG tablet Take 1 tablet by mouth 2 (Two) Times a Day.      Ventolin  (90 Base) MCG/ACT inhaler USE 2 INHALATIONS EVERY 6 HOURS AS NEEDED.      [DISCONTINUED] amLODIPine (NORVASC) 5 MG tablet TAKE 1 TABLET DAILY 90 tablet 3    [DISCONTINUED] clopidogrel (PLAVIX) 75 MG tablet TAKE 1 TABLET DAILY 90 tablet 5    [DISCONTINUED] metoprolol succinate XL (TOPROL-XL) 25 MG 24 hr tablet Take 1 tablet by mouth Daily. (Patient taking differently: Take 0.5 tablets by mouth Daily.) 90 tablet 3    [DISCONTINUED] nitroglycerin (NITROSTAT) 0.4 MG SL tablet 1 under the tongue as needed for angina, may repeat q5mins for up three doses 30 tablet 5    [DISCONTINUED] ranolazine (RANEXA) 500 MG 12 hr tablet  "TAKE 1 TABLET TWO TIMES DAILY 180 tablet 3    [DISCONTINUED] rosuvastatin (CRESTOR) 20 MG tablet Take 1 tablet by mouth Daily. 90 tablet 3    [DISCONTINUED] multivitamin with minerals tablet tablet Take 1 tablet by mouth Daily. (Patient not taking: Reported on 6/11/2024)       No current facility-administered medications on file prior to visit.       ALLERGIES  Patient has no known allergies.    HISTORY  Past Medical History:   Diagnosis Date    COPD (chronic obstructive pulmonary disease)     Diabetes mellitus     Hyperlipidemia        Social History     Socioeconomic History    Marital status:    Tobacco Use    Smoking status: Every Day     Types: Cigarettes    Smokeless tobacco: Never   Vaping Use    Vaping status: Never Used   Substance and Sexual Activity    Alcohol use: Never    Drug use: Never    Sexual activity: Defer       Family History   Problem Relation Age of Onset    No Known Problems Mother     No Known Problems Father        Review of Systems   Constitutional:  Negative for chills, fatigue and fever.   HENT:  Negative for congestion, rhinorrhea, sinus pressure and sinus pain.    Respiratory:  Positive for chest tightness and shortness of breath. Negative for wheezing.    Cardiovascular:  Negative for chest pain, palpitations and leg swelling.   Gastrointestinal: Negative.    Endocrine: Negative.    Genitourinary: Negative.    Musculoskeletal: Negative.  Negative for arthralgias, back pain and neck pain (oxygen 2L).   Skin: Negative.    Allergic/Immunologic: Positive for environmental allergies.   Neurological:  Negative for dizziness, weakness, numbness and headaches.   Hematological:  Bruises/bleeds easily.   Psychiatric/Behavioral:  Positive for sleep disturbance.        Objective     VITALS: /63   Pulse 56   Ht 180.3 cm (71\")   Wt 72 kg (158 lb 12.8 oz)   SpO2 97%   BMI 22.15 kg/m²     LABS:   Lab Results (most recent)       None            IMAGING:   No Images in the past 120 " days found..    EXAM:  Physical Exam  Vitals and nursing note reviewed.   Constitutional:       Appearance: She is well-developed.   HENT:      Head: Normocephalic.   Neck:      Thyroid: No thyroid mass.      Vascular: No carotid bruit or JVD.      Trachea: Trachea and phonation normal.   Cardiovascular:      Rate and Rhythm: Normal rate and regular rhythm.      Pulses:           Radial pulses are 2+ on the right side and 2+ on the left side.        Posterior tibial pulses are 2+ on the right side and 2+ on the left side.      Heart sounds: Normal heart sounds. No murmur heard.     No friction rub. No gallop.   Pulmonary:      Effort: Pulmonary effort is normal. No respiratory distress.      Breath sounds: Normal breath sounds. No wheezing or rales.   Musculoskeletal:         General: No swelling. Normal range of motion.      Cervical back: Neck supple.   Skin:     General: Skin is warm and dry.      Capillary Refill: Capillary refill takes less than 2 seconds.      Findings: No rash.   Neurological:      Mental Status: She is alert and oriented to person, place, and time.   Psychiatric:         Speech: Speech normal.         Behavior: Behavior normal.         Thought Content: Thought content normal.         Judgment: Judgment normal.         Procedure   Procedures       Assessment & Plan    Diagnosis Plan   1. Coronary artery disease involving native coronary artery of native heart without angina pectoris  amLODIPine (NORVASC) 5 MG tablet    clopidogrel (PLAVIX) 75 MG tablet    metoprolol succinate XL (TOPROL-XL) 25 MG 24 hr tablet    rosuvastatin (CRESTOR) 20 MG tablet      2. Precordial chest pain  ranolazine (RANEXA) 500 MG 12 hr tablet      3. Shortness of breath        4. Primary hypertension        5. Palpitations  ranolazine (RANEXA) 500 MG 12 hr tablet      6. Angina pectoris  ranolazine (RANEXA) 500 MG 12 hr tablet      1.  Patient will continue the Plavix, rosuvastatin, and Ranexa at current dosing.  We  did discuss the reoccurrence of her chest tightness which is atypical for angina.  We did discuss repeating stress test however a joint decision was made to defer at this time.  2.  Patient's blood pressure is controlled on current blood pressure medication regimen.  No medication changes are warranted at this time.  Patient advised to monitor blood pressure on a daily basis and report any persistent highs or lows.  Set goal blood pressure for patient at 130/80 or below.  3.  Patient does have hyperlipidemia in which she is being followed by her PCP.  She has upcoming labs with her PCP next month.  We have requested her previous labs from her PCP as well.  4.  Informed of signs and symptoms of ACS and advised to seek emergent treatment for any new worsening symptoms.  Patient also advised sooner follow-up as needed.  Also advised to follow-up with family doctor as needed  This note is dictated utilizing voice recognition software.  Although this record has been proof read, transcriptional errors may still be present. If questions occur regarding the content of this record please do not hesitate to call our office.  I have reviewed and confirmed the accuracy of the ROS as documented by the MA/LPN/RN TRAVIS Joseph    Return if symptoms worsen or fail to improve, for Next scheduled follow up.    Diagnoses and all orders for this visit:    1. Coronary artery disease involving native coronary artery of native heart without angina pectoris (Primary)  -     amLODIPine (NORVASC) 5 MG tablet; Take 1 tablet by mouth Daily.  Dispense: 90 tablet; Refill: 3  -     clopidogrel (PLAVIX) 75 MG tablet; Take 1 tablet by mouth Daily.  Dispense: 90 tablet; Refill: 3  -     metoprolol succinate XL (TOPROL-XL) 25 MG 24 hr tablet; Take 0.5 tablets by mouth Daily.  Dispense: 45 tablet; Refill: 3  -     rosuvastatin (CRESTOR) 20 MG tablet; Take 1 tablet by mouth Daily.  Dispense: 90 tablet; Refill: 3    2. Precordial chest pain  -      ranolazine (RANEXA) 500 MG 12 hr tablet; Take 1 tablet by mouth 2 (Two) Times a Day.  Dispense: 180 tablet; Refill: 3    3. Shortness of breath    4. Primary hypertension    5. Palpitations  -     ranolazine (RANEXA) 500 MG 12 hr tablet; Take 1 tablet by mouth 2 (Two) Times a Day.  Dispense: 180 tablet; Refill: 3    6. Angina pectoris  -     ranolazine (RANEXA) 500 MG 12 hr tablet; Take 1 tablet by mouth 2 (Two) Times a Day.  Dispense: 180 tablet; Refill: 3    Other orders  -     nitroglycerin (NITROSTAT) 0.4 MG SL tablet; 1 under the tongue as needed for angina, may repeat q5mins for up three doses  Dispense: 30 tablet; Refill: 5        Alysia Brennan  reports that she has been smoking cigarettes. She has never used smokeless tobacco. I have educated her on the risk of diseases from using tobacco products such as cancer, COPD, and heart disease.     I advised her to quit and she is not willing to quit.    I spent 3.5 minutes counseling the patient.           BMI is within normal parameters. No other follow-up for BMI required.           MEDS ORDERED DURING VISIT:  New Medications Ordered This Visit   Medications    amLODIPine (NORVASC) 5 MG tablet     Sig: Take 1 tablet by mouth Daily.     Dispense:  90 tablet     Refill:  3    clopidogrel (PLAVIX) 75 MG tablet     Sig: Take 1 tablet by mouth Daily.     Dispense:  90 tablet     Refill:  3     90 DAYS PLEASE    metoprolol succinate XL (TOPROL-XL) 25 MG 24 hr tablet     Sig: Take 0.5 tablets by mouth Daily.     Dispense:  45 tablet     Refill:  3     PT WANTS 90 DAYS    ranolazine (RANEXA) 500 MG 12 hr tablet     Sig: Take 1 tablet by mouth 2 (Two) Times a Day.     Dispense:  180 tablet     Refill:  3    rosuvastatin (CRESTOR) 20 MG tablet     Sig: Take 1 tablet by mouth Daily.     Dispense:  90 tablet     Refill:  3    nitroglycerin (NITROSTAT) 0.4 MG SL tablet     Si under the tongue as needed for angina, may repeat q5mins for up three doses     Dispense:  30  tablet     Refill:  5           This document has been electronically signed by Kei Cartagena Jr., APRN  June 11, 2024 09:04 EDT

## 2024-07-17 ENCOUNTER — TELEPHONE (OUTPATIENT)
Dept: CARDIOLOGY | Facility: CLINIC | Age: 59
End: 2024-07-17
Payer: MEDICARE

## 2024-07-17 NOTE — TELEPHONE ENCOUNTER
Received cardiac clearance request from DR KAYLEE VANCE stating pt has COLONOSCOPY scheduled for 08/13/2024 and is requiring a cardiac clearance. Placed cardiac clearance request in LACHO'S inbox to review and address with provider.

## 2024-12-16 ENCOUNTER — OFFICE VISIT (OUTPATIENT)
Dept: CARDIOLOGY | Facility: CLINIC | Age: 59
End: 2024-12-16
Payer: MEDICARE

## 2024-12-16 VITALS
DIASTOLIC BLOOD PRESSURE: 73 MMHG | HEART RATE: 57 BPM | SYSTOLIC BLOOD PRESSURE: 122 MMHG | WEIGHT: 162.6 LBS | OXYGEN SATURATION: 96 % | BODY MASS INDEX: 22.76 KG/M2 | HEIGHT: 71 IN

## 2024-12-16 DIAGNOSIS — I25.10 CORONARY ARTERY DISEASE INVOLVING NATIVE CORONARY ARTERY OF NATIVE HEART WITHOUT ANGINA PECTORIS: Primary | ICD-10-CM

## 2024-12-16 DIAGNOSIS — I10 PRIMARY HYPERTENSION: ICD-10-CM

## 2024-12-16 DIAGNOSIS — R07.2 PRECORDIAL CHEST PAIN: ICD-10-CM

## 2024-12-16 DIAGNOSIS — R06.02 SHORTNESS OF BREATH: ICD-10-CM

## 2024-12-16 PROCEDURE — 1159F MED LIST DOCD IN RCRD: CPT | Performed by: NURSE PRACTITIONER

## 2024-12-16 PROCEDURE — 93000 ELECTROCARDIOGRAM COMPLETE: CPT | Performed by: NURSE PRACTITIONER

## 2024-12-16 PROCEDURE — 99214 OFFICE O/P EST MOD 30 MIN: CPT | Performed by: NURSE PRACTITIONER

## 2024-12-16 PROCEDURE — 1160F RVW MEDS BY RX/DR IN RCRD: CPT | Performed by: NURSE PRACTITIONER

## 2024-12-16 NOTE — PROGRESS NOTES
Subjective     Alysia Brennan is a 59 y.o. female who presents to [facility] for Follow-up and Coronary Artery Disease.    CHIEF COMPLIANT  Chief Complaint   Patient presents with    Follow-up    Coronary Artery Disease       Active Problems:  Problem List Items Addressed This Visit    None  Visit Diagnoses       Coronary artery disease involving native coronary artery of native heart without angina pectoris    -  Primary    Relevant Orders    ECG 12 Lead    Stress Test With Myocardial Perfusion One Day    Precordial chest pain        Relevant Orders    ECG 12 Lead    Stress Test With Myocardial Perfusion One Day    Primary hypertension        Relevant Orders    ECG 12 Lead    Stress Test With Myocardial Perfusion One Day    Shortness of breath        Relevant Orders    ECG 12 Lead    Stress Test With Myocardial Perfusion One Day        Active Problems:  1.  Chest pain  1.1 left heart catheterization 2/22: Left main normal, LAD normal, circumflex normal, 70% long diffuse calcified disease in the RCA with stent placement of 3.5 x 38 mm drug-eluting stent  2.  Shortness of breath, sees Dr Caldera  2.1 echocardiogram 1/22: EF 50 to 55% questionable mild septal hypokinesis, grade 1 diastolic dysfunction, no significant valvular disease  3.  Hypertension    HPI  HPI  Ms. Alysia Brennan is a 58-year-old female patient who is being seen today for history of coronary artery disease.     She does have a history of coronary artery disease in which she did receive a stent to the right coronary artery in February 2022.  She is on antiplatelet therapy of Plavix and high intensity statin therapy of rosuvastatin.  She is also on Ranexa for antianginal therapy.     Patient does have chronic arterial hypertension and which she is being treated with metoprolol and amlodipine.  Today her blood pressure is 122/73 and heart rate of 57.    Patient does report chest pain that occurs intermittently in her chest.  She pointed out to be in the  epigastric to lower left breast area.  Occurs about 1 time a week lasting up to an hour.  She says it does seem to be a little improved but still persistent.  She usually takes an antiacid at times and sometimes Tylenol which helps intermittently.  She denies any associated symptoms.  Can occur both with rest and exertion.    Patient does have chronic shortness of breath unchanged nonprogressive.  Usually occurs with activity when she is doing anything strenuous.  Says it usually does not occur at rest or if she denies any orthopnea.      PRIOR MEDS  Current Outpatient Medications on File Prior to Visit   Medication Sig Dispense Refill    amLODIPine (NORVASC) 5 MG tablet TAKE 1 TABLET DAILY 90 tablet 5    arformoterol (BROVANA) 15 MCG/2ML nebulizer solution Take  by nebulization 2 (Two) Times a Day.      budesonide (PULMICORT) 0.25 MG/2ML nebulizer solution Take 2 mL by nebulization Daily.      citalopram (CeleXA) 40 MG tablet Take 1 tablet by mouth Daily.      clopidogrel (PLAVIX) 75 MG tablet Take 1 tablet by mouth Daily. 90 tablet 3    glimepiride (AMARYL) 4 MG tablet Take 1 tablet by mouth 2 (Two) Times a Day.      metFORMIN (GLUCOPHAGE) 1000 MG tablet Take 1 tablet by mouth 2 (Two) Times a Day With Meals.      metoprolol succinate XL (TOPROL-XL) 25 MG 24 hr tablet Take 0.5 tablets by mouth Daily. 45 tablet 3    nitroglycerin (NITROSTAT) 0.4 MG SL tablet 1 under the tongue as needed for angina, may repeat q5mins for up three doses 30 tablet 5    O2 (OXYGEN) Inhale 2 L/min As Needed.      ranolazine (RANEXA) 500 MG 12 hr tablet Take 1 tablet by mouth 2 (Two) Times a Day. 180 tablet 3    rosuvastatin (CRESTOR) 20 MG tablet Take 1 tablet by mouth Daily. 90 tablet 3    theophylline (EDMOND-24) 300 MG 24 hr capsule Take 1 capsule by mouth Daily.      varenicline (CHANTIX) 1 MG tablet Take 1 tablet by mouth 2 (Two) Times a Day.      Ventolin  (90 Base) MCG/ACT inhaler USE 2 INHALATIONS EVERY 6 HOURS AS NEEDED.    "   cetirizine (zyrTEC) 10 MG tablet Take 1 tablet by mouth Daily. (Patient not taking: Reported on 12/16/2024)       No current facility-administered medications on file prior to visit.       ALLERGIES  Patient has no known allergies.    HISTORY  Past Medical History:   Diagnosis Date    COPD (chronic obstructive pulmonary disease)     Diabetes mellitus     Hyperlipidemia        Social History     Socioeconomic History    Marital status:    Tobacco Use    Smoking status: Every Day     Types: Cigarettes    Smokeless tobacco: Never   Vaping Use    Vaping status: Never Used   Substance and Sexual Activity    Alcohol use: Never    Drug use: Never    Sexual activity: Defer       Family History   Problem Relation Age of Onset    No Known Problems Mother     No Known Problems Father        Review of Systems   Constitutional:  Negative for chills, fatigue and fever.   HENT:  Positive for hearing loss and rhinorrhea. Negative for congestion and sore throat.    Eyes:  Negative for visual disturbance.   Respiratory:  Positive for shortness of breath (COPD worse wioth activity). Negative for apnea and chest tightness.    Cardiovascular:  Positive for chest pain (has pain is hard to tell if its her stomach or chest). Negative for palpitations and leg swelling.   Gastrointestinal:  Negative for constipation, diarrhea and nausea.   Musculoskeletal:  Negative for arthralgias, back pain and neck pain.   Skin:  Negative for rash and wound.   Allergic/Immunologic: Positive for environmental allergies. Negative for food allergies.   Neurological:  Negative for dizziness, syncope, weakness and light-headedness.   Hematological:  Bruises/bleeds easily.   Psychiatric/Behavioral:  Negative for sleep disturbance.        Objective     VITALS: /73 (BP Location: Left arm, Patient Position: Sitting, Cuff Size: Adult)   Pulse 57   Ht 180.3 cm (70.98\")   Wt 73.8 kg (162 lb 9.6 oz)   SpO2 96%   BMI 22.69 kg/m²     LABS:   Lab " Results (most recent)       None            IMAGING:   No Images in the past 120 days found..    EXAM:  Physical Exam  Vitals and nursing note reviewed.   Constitutional:       Appearance: She is well-developed.   HENT:      Head: Normocephalic.   Neck:      Thyroid: No thyroid mass.      Vascular: No carotid bruit or JVD.      Trachea: Trachea and phonation normal.   Cardiovascular:      Rate and Rhythm: Normal rate and regular rhythm.      Pulses:           Radial pulses are 2+ on the right side and 2+ on the left side.        Posterior tibial pulses are 2+ on the right side and 2+ on the left side.      Heart sounds: Normal heart sounds. No murmur heard.     No friction rub. No gallop.   Pulmonary:      Effort: Pulmonary effort is normal. No respiratory distress.      Breath sounds: Normal breath sounds. No wheezing or rales.   Musculoskeletal:         General: No swelling. Normal range of motion.      Cervical back: Neck supple.   Skin:     General: Skin is warm and dry.      Capillary Refill: Capillary refill takes less than 2 seconds.      Findings: No rash.   Neurological:      Mental Status: She is alert and oriented to person, place, and time.   Psychiatric:         Speech: Speech normal.         Behavior: Behavior normal.         Thought Content: Thought content normal.         Judgment: Judgment normal.         Procedure     ECG 12 Lead    Date/Time: 12/16/2024 9:21 AM  Performed by: Kei Cartagena APRN    Authorized by: Kei Cartagena APRN  Comparison: compared with previous ECG from 12/8/2023  Similar to previous ECG  Rhythm: sinus bradycardia  Rate: bradycardic  BPM: 56  QRS axis: normal  Comments: QTc 432 ms  No acute changes             Assessment & Plan    Diagnosis Plan   1. Coronary artery disease involving native coronary artery of native heart without angina pectoris  ECG 12 Lead    Stress Test With Myocardial Perfusion One Day      2. Precordial chest pain  ECG 12 Lead    Stress Test With  Myocardial Perfusion One Day      3. Primary hypertension  ECG 12 Lead    Stress Test With Myocardial Perfusion One Day      4. Shortness of breath  ECG 12 Lead    Stress Test With Myocardial Perfusion One Day      1.  Patient does have a history of coronary artery disease with stenting to the RCA in 2022.  She does have residual chest pain that occurs at least weekly lasting up to an hour that is concerning for angina.  Therefore I would like for her to go under further evaluation with stress test.  She previously went under a treadmill stress test but verbalizes that she does not feel like she can complete it now due to her breathing.    2.  Patient's blood pressure is controlled on current blood pressure medication regimen.  No medication changes are warranted at this time.  Patient advised to monitor blood pressure on a daily basis and report any persistent highs or lows.  Set goal blood pressure for patient at 130/80 or below.    3.  Patient does have hyperlipidemia which is well-controlled on the rosuvastatin.  Most recent lipid panel was done in July.    4.  Patient's shortness of breath is chronic and unchanged.  Will continue to follow at this time.    5.  Informed of signs and symptoms of ACS and advised to seek emergent treatment for any new worsening symptoms.  Patient also advised sooner follow-up as needed.  Also advised to follow-up with family doctor as needed  This note is dictated utilizing voice recognition software.  Although this record has been proof read, transcriptional errors may still be present. If questions occur regarding the content of this record please do not hesitate to call our office.  I have reviewed and confirmed the accuracy of the ROS as documented by the MA/LPN/RN TRAVIS Joseph    Return if symptoms worsen or fail to improve, for Next scheduled follow up.    Diagnoses and all orders for this visit:    1. Coronary artery disease involving native coronary artery of native  heart without angina pectoris (Primary)  -     ECG 12 Lead  -     Stress Test With Myocardial Perfusion One Day; Future    2. Precordial chest pain  -     ECG 12 Lead  -     Stress Test With Myocardial Perfusion One Day; Future    3. Primary hypertension  -     ECG 12 Lead  -     Stress Test With Myocardial Perfusion One Day; Future    4. Shortness of breath  -     ECG 12 Lead  -     Stress Test With Myocardial Perfusion One Day; Future        Alysia Brennan  reports that she has been smoking cigarettes. She has never used smokeless tobacco. I have educated her on the risk of diseases from using tobacco products such as cancer, COPD, and heart disease.     I advised her to quit and she is willing to quit. She has cut back is using Chantex to help try to quit.I spent 5.5 minutes counseling the patient.           BMI is within normal parameters. No other follow-up for BMI required.           MEDS ORDERED DURING VISIT:  No orders of the defined types were placed in this encounter.          This document has been electronically signed by Kei Cartagena Jr., APRN  December 16, 2024 09:57 EST

## 2025-01-27 ENCOUNTER — HOSPITAL ENCOUNTER (OUTPATIENT)
Dept: CARDIOLOGY | Facility: HOSPITAL | Age: 60
Discharge: HOME | End: 2025-01-27
Payer: MEDICARE

## 2025-01-27 DIAGNOSIS — I25.10 CORONARY ARTERY DISEASE INVOLVING NATIVE CORONARY ARTERY OF NATIVE HEART WITHOUT ANGINA PECTORIS: ICD-10-CM

## 2025-01-27 DIAGNOSIS — R07.2 PRECORDIAL CHEST PAIN: ICD-10-CM

## 2025-01-27 DIAGNOSIS — I10 PRIMARY HYPERTENSION: ICD-10-CM

## 2025-01-27 DIAGNOSIS — R06.02 SHORTNESS OF BREATH: ICD-10-CM

## 2025-01-27 PROCEDURE — A9500 TC99M SESTAMIBI: HCPCS | Performed by: INTERNAL MEDICINE

## 2025-01-27 PROCEDURE — 93017 CV STRESS TEST TRACING ONLY: CPT

## 2025-01-27 PROCEDURE — 34310000005 TECHNETIUM SESTAMIBI: Performed by: INTERNAL MEDICINE

## 2025-01-27 PROCEDURE — 25010000002 REGADENOSON 0.4 MG/5ML SOLUTION: Performed by: INTERNAL MEDICINE

## 2025-01-27 PROCEDURE — 78452 HT MUSCLE IMAGE SPECT MULT: CPT

## 2025-01-27 RX ORDER — REGADENOSON 0.08 MG/ML
0.4 INJECTION, SOLUTION INTRAVENOUS
Status: COMPLETED | OUTPATIENT
Start: 2025-01-27 | End: 2025-01-27

## 2025-01-27 RX ADMIN — REGADENOSON 0.4 MG: 0.08 INJECTION, SOLUTION INTRAVENOUS at 11:28

## 2025-01-27 RX ADMIN — TECHNETIUM TC 99M SESTAMIBI 1 DOSE: 1 INJECTION INTRAVENOUS at 11:29

## 2025-01-27 RX ADMIN — TECHNETIUM TC 99M SESTAMIBI 1 DOSE: 1 INJECTION INTRAVENOUS at 09:55

## 2025-01-31 LAB
BH CV REST NUCLEAR ISOTOPE DOSE: 10 MCI
BH CV STRESS COMMENTS STAGE 1: NORMAL
BH CV STRESS DOSE REGADENOSON STAGE 1: 0.4
BH CV STRESS DURATION MIN STAGE 1: 0
BH CV STRESS DURATION SEC STAGE 1: 10
BH CV STRESS NUCLEAR ISOTOPE DOSE: 30 MCI
BH CV STRESS PROTOCOL 1: NORMAL
BH CV STRESS RECOVERY BP: NORMAL MMHG
BH CV STRESS RECOVERY HR: 67 BPM
BH CV STRESS STAGE 1: 1
MAXIMAL PREDICTED HEART RATE: 159 BPM
PERCENT MAX PREDICTED HR: 40.88 %
STRESS BASELINE BP: NORMAL MMHG
STRESS BASELINE HR: 53 BPM
STRESS PERCENT HR: 48 %
STRESS POST PEAK BP: NORMAL MMHG
STRESS POST PEAK HR: 65 BPM
STRESS TARGET HR: 135 BPM

## 2025-02-04 ENCOUNTER — TELEPHONE (OUTPATIENT)
Dept: CARDIOLOGY | Facility: CLINIC | Age: 60
End: 2025-02-04
Payer: MEDICARE

## 2025-05-29 ENCOUNTER — OFFICE VISIT (OUTPATIENT)
Dept: CARDIOLOGY | Facility: CLINIC | Age: 60
End: 2025-05-29
Payer: MEDICARE

## 2025-05-29 VITALS
DIASTOLIC BLOOD PRESSURE: 68 MMHG | WEIGHT: 162 LBS | HEART RATE: 62 BPM | HEIGHT: 71 IN | OXYGEN SATURATION: 96 % | BODY MASS INDEX: 22.68 KG/M2 | SYSTOLIC BLOOD PRESSURE: 127 MMHG

## 2025-05-29 DIAGNOSIS — R06.02 SHORTNESS OF BREATH: ICD-10-CM

## 2025-05-29 DIAGNOSIS — I20.9 ANGINA PECTORIS: ICD-10-CM

## 2025-05-29 DIAGNOSIS — I25.10 CORONARY ARTERY DISEASE INVOLVING NATIVE CORONARY ARTERY OF NATIVE HEART WITHOUT ANGINA PECTORIS: ICD-10-CM

## 2025-05-29 DIAGNOSIS — I10 PRIMARY HYPERTENSION: Primary | ICD-10-CM

## 2025-05-29 DIAGNOSIS — R00.2 PALPITATIONS: ICD-10-CM

## 2025-05-29 DIAGNOSIS — R07.2 PRECORDIAL CHEST PAIN: ICD-10-CM

## 2025-05-29 RX ORDER — CLOPIDOGREL BISULFATE 75 MG/1
75 TABLET ORAL DAILY
Qty: 90 TABLET | Refills: 3 | Status: SHIPPED | OUTPATIENT
Start: 2025-05-29

## 2025-05-29 RX ORDER — METOPROLOL SUCCINATE 25 MG/1
12.5 TABLET, EXTENDED RELEASE ORAL DAILY
Qty: 45 TABLET | Refills: 3 | Status: SHIPPED | OUTPATIENT
Start: 2025-05-29

## 2025-05-29 RX ORDER — RANOLAZINE 500 MG/1
500 TABLET, EXTENDED RELEASE ORAL 2 TIMES DAILY
Qty: 180 TABLET | Refills: 3 | Status: SHIPPED | OUTPATIENT
Start: 2025-05-29

## 2025-05-29 RX ORDER — AMLODIPINE BESYLATE 5 MG/1
5 TABLET ORAL DAILY
Qty: 90 TABLET | Refills: 5 | Status: SHIPPED | OUTPATIENT
Start: 2025-05-29

## 2025-05-29 RX ORDER — ROSUVASTATIN CALCIUM 20 MG/1
20 TABLET, COATED ORAL DAILY
Qty: 90 TABLET | Refills: 3 | Status: SHIPPED | OUTPATIENT
Start: 2025-05-29

## 2025-05-29 RX ORDER — NITROGLYCERIN 0.4 MG/1
TABLET SUBLINGUAL
Qty: 30 TABLET | Refills: 5 | Status: SHIPPED | OUTPATIENT
Start: 2025-05-29

## 2025-05-29 NOTE — PROGRESS NOTES
Subjective     Alysia Brennan is a 59 y.o. female who presents to [facility] for Follow-up, Coronary Artery Disease, and Hyperlipidemia.    CHIEF COMPLIANT  Chief Complaint   Patient presents with    Follow-up    Coronary Artery Disease    Hyperlipidemia       Active Problems:  Problem List Items Addressed This Visit    None  Visit Diagnoses         Primary hypertension    -  Primary    Relevant Medications    amLODIPine (NORVASC) 5 MG tablet    metoprolol succinate XL (TOPROL-XL) 25 MG 24 hr tablet      Coronary artery disease involving native coronary artery of native heart without angina pectoris        Relevant Medications    amLODIPine (NORVASC) 5 MG tablet    clopidogrel (PLAVIX) 75 MG tablet    metoprolol succinate XL (TOPROL-XL) 25 MG 24 hr tablet    nitroglycerin (NITROSTAT) 0.4 MG SL tablet    ranolazine (RANEXA) 500 MG 12 hr tablet    rosuvastatin (CRESTOR) 20 MG tablet      Precordial chest pain        Relevant Medications    ranolazine (RANEXA) 500 MG 12 hr tablet      Shortness of breath          Palpitations        Relevant Medications    ranolazine (RANEXA) 500 MG 12 hr tablet      Angina pectoris        Relevant Medications    amLODIPine (NORVASC) 5 MG tablet    clopidogrel (PLAVIX) 75 MG tablet    metoprolol succinate XL (TOPROL-XL) 25 MG 24 hr tablet    nitroglycerin (NITROSTAT) 0.4 MG SL tablet    ranolazine (RANEXA) 500 MG 12 hr tablet        1.  Chest pain  1.1 left heart catheterization 2/22: Left main normal, LAD normal, circumflex normal, 70% long diffuse calcified disease in the RCA with stent placement of 3.5 x 38 mm drug-eluting stent  1.2 stress test 1/25: Negative for ischemia, EF 66%  2.  Shortness of breath, sees Dr Caldera  2.1 echocardiogram 1/22: EF 50 to 55% questionable mild septal hypokinesis, grade 1 diastolic dysfunction, no significant valvular disease  3.  Hypertension    HPI  HPI  Ms. Alysia Brennan is a 59-year-old female patient who is being seen today for history of coronary artery  disease.     She does have a history of coronary artery disease in which she did receive a stent to the right coronary artery in February 2022.  She is on antiplatelet therapy of Plavix and high intensity statin therapy of rosuvastatin.  She is also on Ranexa for antianginal therapy.     Patient does have chronic arterial hypertension and which she is being treated with metoprolol and amlodipine.  Today her blood pressure is 127/68 heart rate is 62    Patient did go under a nuclear stress test that was negative for ischemia with a preserved post-rest ejection fraction of 66% with no focal wall motion abnormalities.  We did discuss this in detail.    Patient does continue have shortness of breath with a history of COPD.  She says that her shortness of breath worse with activity.  She denies any shortness of breath at rest or any orthopnea.    Patient does report some chest tightness that occurs intermittently in her chest on a rare occasion.  Maybe once a week and is usually short-lived.  She does have associated shortness of breath.    PRIOR MEDS  Current Outpatient Medications on File Prior to Visit   Medication Sig Dispense Refill    arformoterol (BROVANA) 15 MCG/2ML nebulizer solution Take  by nebulization 2 (Two) Times a Day.      budesonide (PULMICORT) 0.25 MG/2ML nebulizer solution Take 2 mL by nebulization Daily.      citalopram (CeleXA) 40 MG tablet Take 1 tablet by mouth Daily.      glimepiride (AMARYL) 4 MG tablet Take 1 tablet by mouth 2 (Two) Times a Day.      metFORMIN (GLUCOPHAGE) 1000 MG tablet Take 1 tablet by mouth 2 (Two) Times a Day With Meals.      O2 (OXYGEN) Inhale 2 L/min As Needed.      theophylline (EDMOND-24) 300 MG 24 hr capsule Take 1 capsule by mouth Daily.      varenicline (CHANTIX) 1 MG tablet Take 1 tablet by mouth 2 (Two) Times a Day.      Ventolin  (90 Base) MCG/ACT inhaler USE 2 INHALATIONS EVERY 6 HOURS AS NEEDED.      [DISCONTINUED] amLODIPine (NORVASC) 5 MG tablet TAKE 1  TABLET DAILY 90 tablet 5    [DISCONTINUED] clopidogrel (PLAVIX) 75 MG tablet Take 1 tablet by mouth Daily. 90 tablet 3    [DISCONTINUED] metoprolol succinate XL (TOPROL-XL) 25 MG 24 hr tablet Take 0.5 tablets by mouth Daily. 45 tablet 3    [DISCONTINUED] nitroglycerin (NITROSTAT) 0.4 MG SL tablet 1 under the tongue as needed for angina, may repeat q5mins for up three doses 30 tablet 5    [DISCONTINUED] ranolazine (RANEXA) 500 MG 12 hr tablet Take 1 tablet by mouth 2 (Two) Times a Day. 180 tablet 3    [DISCONTINUED] rosuvastatin (CRESTOR) 20 MG tablet Take 1 tablet by mouth Daily. 90 tablet 3    [DISCONTINUED] cetirizine (zyrTEC) 10 MG tablet Take 1 tablet by mouth Daily. (Patient not taking: Reported on 12/16/2024)       No current facility-administered medications on file prior to visit.       ALLERGIES  Patient has no known allergies.    HISTORY  Past Medical History:   Diagnosis Date    COPD (chronic obstructive pulmonary disease)     Diabetes mellitus     Hyperlipidemia        Social History     Socioeconomic History    Marital status:    Tobacco Use    Smoking status: Every Day     Types: Cigarettes    Smokeless tobacco: Never    Tobacco comments:     Patient smokes 5-6 cigarettes day   Vaping Use    Vaping status: Never Used   Substance and Sexual Activity    Alcohol use: Never    Drug use: Never    Sexual activity: Defer       Family History   Problem Relation Age of Onset    No Known Problems Mother     No Known Problems Father        Review of Systems   Constitutional:  Negative for chills, fatigue and fever.   HENT:  Positive for rhinorrhea. Negative for congestion and sore throat.    Eyes:  Negative for visual disturbance.   Respiratory:  Positive for chest tightness and shortness of breath (patient states it is from COPD / more with activity). Negative for apnea.    Cardiovascular:  Negative for chest pain, palpitations and leg swelling.   Gastrointestinal:  Negative for constipation, diarrhea  "and nausea.   Musculoskeletal:  Negative for arthralgias, back pain and neck pain.   Skin:  Negative for rash and wound.   Allergic/Immunologic: Positive for environmental allergies. Negative for food allergies.   Neurological:  Positive for weakness (with over exertion). Negative for dizziness, syncope and light-headedness.   Hematological:  Bruises/bleeds easily (bruise).   Psychiatric/Behavioral:  Negative for sleep disturbance (uses O2 @ 2 liters sleeping good no SOB).        Objective     VITALS: /68 (BP Location: Left arm, Patient Position: Sitting, Cuff Size: Adult)   Pulse 62   Ht 180.3 cm (70.98\")   Wt 73.5 kg (162 lb)   SpO2 96%   BMI 22.60 kg/m²     LABS:   Lab Results (most recent)       None            IMAGING:   No Images in the past 120 days found..    EXAM:  Physical Exam  Vitals and nursing note reviewed.   Constitutional:       Appearance: She is well-developed.   HENT:      Head: Normocephalic.   Neck:      Thyroid: No thyroid mass.      Vascular: No carotid bruit or JVD.      Trachea: Trachea and phonation normal.   Cardiovascular:      Rate and Rhythm: Normal rate and regular rhythm.      Pulses:           Radial pulses are 2+ on the right side and 2+ on the left side.        Posterior tibial pulses are 2+ on the right side and 2+ on the left side.      Heart sounds: Normal heart sounds. No murmur heard.     No friction rub. No gallop.   Pulmonary:      Effort: Pulmonary effort is normal. No respiratory distress.      Breath sounds: Normal breath sounds. No wheezing or rales.   Musculoskeletal:         General: No swelling. Normal range of motion.      Cervical back: Neck supple.   Skin:     General: Skin is warm and dry.      Capillary Refill: Capillary refill takes less than 2 seconds.      Findings: No rash.   Neurological:      Mental Status: She is alert and oriented to person, place, and time.   Psychiatric:         Speech: Speech normal.         Behavior: Behavior normal.       "   Thought Content: Thought content normal.         Judgment: Judgment normal.         Procedure   Procedures       Assessment & Plan    Diagnosis Plan   1. Primary hypertension        2. Coronary artery disease involving native coronary artery of native heart without angina pectoris  amLODIPine (NORVASC) 5 MG tablet    clopidogrel (PLAVIX) 75 MG tablet    metoprolol succinate XL (TOPROL-XL) 25 MG 24 hr tablet    rosuvastatin (CRESTOR) 20 MG tablet      3. Precordial chest pain  ranolazine (RANEXA) 500 MG 12 hr tablet      4. Shortness of breath        5. Palpitations  ranolazine (RANEXA) 500 MG 12 hr tablet      6. Angina pectoris  ranolazine (RANEXA) 500 MG 12 hr tablet      1.  Patient's blood pressure is controlled on current blood pressure medication regimen.  No medication changes are warranted at this time.  Patient advised to monitor blood pressure on a daily basis and report any persistent highs or lows.  Set goal blood pressure for patient at 130/80 or below.  2.  Patient continues to have chest tightness shortness of breath which we did repeat his stress test that was negative for ischemia with a preserved ejection fraction.  She does have a history of stenting to the RCA in the past.  Patient is on antianginal therapy of Ranexa.  3.  Overall patient feels like she is done relatively well from a cardiovascular standpoint.  We will continue to monitor at this time.  4.  Informed of signs and symptoms of ACS and advised to seek emergent treatment for any new worsening symptoms.  Patient also advised sooner follow-up as needed.  Also advised to follow-up with family doctor as needed  This note is dictated utilizing voice recognition software.  Although this record has been proof read, transcriptional errors may still be present. If questions occur regarding the content of this record please do not hesitate to call our office.  I have reviewed and confirmed the accuracy of the ROS as documented by the  MA/HARINDER/RHEA Cartagena, TRAVIS    No follow-ups on file.    Diagnoses and all orders for this visit:    1. Primary hypertension (Primary)    2. Coronary artery disease involving native coronary artery of native heart without angina pectoris  -     amLODIPine (NORVASC) 5 MG tablet; Take 1 tablet by mouth Daily.  Dispense: 90 tablet; Refill: 5  -     clopidogrel (PLAVIX) 75 MG tablet; Take 1 tablet by mouth Daily.  Dispense: 90 tablet; Refill: 3  -     metoprolol succinate XL (TOPROL-XL) 25 MG 24 hr tablet; Take 0.5 tablets by mouth Daily.  Dispense: 45 tablet; Refill: 3  -     rosuvastatin (CRESTOR) 20 MG tablet; Take 1 tablet by mouth Daily.  Dispense: 90 tablet; Refill: 3    3. Precordial chest pain  -     ranolazine (RANEXA) 500 MG 12 hr tablet; Take 1 tablet by mouth 2 (Two) Times a Day.  Dispense: 180 tablet; Refill: 3    4. Shortness of breath    5. Palpitations  -     ranolazine (RANEXA) 500 MG 12 hr tablet; Take 1 tablet by mouth 2 (Two) Times a Day.  Dispense: 180 tablet; Refill: 3    6. Angina pectoris  -     ranolazine (RANEXA) 500 MG 12 hr tablet; Take 1 tablet by mouth 2 (Two) Times a Day.  Dispense: 180 tablet; Refill: 3    Other orders  -     nitroglycerin (NITROSTAT) 0.4 MG SL tablet; 1 under the tongue as needed for angina, may repeat q5mins for up three doses  Dispense: 30 tablet; Refill: 5        Alysia Brennan  reports that she has been smoking cigarettes. She has never used smokeless tobacco. I have educated her on the risk of diseases from using tobacco products such as cancer, COPD, and heart disease. Patient is taking chantex to help her quit.    I advised her to quit and she is willing to quit.   I spent 5.5 minutes counseling the patient.             BMI is within normal parameters. No other follow-up for BMI required.           MEDS ORDERED DURING VISIT:  New Medications Ordered This Visit   Medications    amLODIPine (NORVASC) 5 MG tablet     Sig: Take 1 tablet by mouth Daily.     Dispense:   90 tablet     Refill:  5    clopidogrel (PLAVIX) 75 MG tablet     Sig: Take 1 tablet by mouth Daily.     Dispense:  90 tablet     Refill:  3     90 DAYS PLEASE    metoprolol succinate XL (TOPROL-XL) 25 MG 24 hr tablet     Sig: Take 0.5 tablets by mouth Daily.     Dispense:  45 tablet     Refill:  3     PT WANTS 90 DAYS    nitroglycerin (NITROSTAT) 0.4 MG SL tablet     Si under the tongue as needed for angina, may repeat q5mins for up three doses     Dispense:  30 tablet     Refill:  5    ranolazine (RANEXA) 500 MG 12 hr tablet     Sig: Take 1 tablet by mouth 2 (Two) Times a Day.     Dispense:  180 tablet     Refill:  3    rosuvastatin (CRESTOR) 20 MG tablet     Sig: Take 1 tablet by mouth Daily.     Dispense:  90 tablet     Refill:  3           This document has been electronically signed by Kei Cartagena Jr., APRN  May 29, 2025 09:27 EDT